# Patient Record
Sex: FEMALE | Race: WHITE | NOT HISPANIC OR LATINO | URBAN - METROPOLITAN AREA
[De-identification: names, ages, dates, MRNs, and addresses within clinical notes are randomized per-mention and may not be internally consistent; named-entity substitution may affect disease eponyms.]

---

## 2017-04-04 ENCOUNTER — OUTPATIENT (OUTPATIENT)
Dept: OUTPATIENT SERVICES | Facility: HOSPITAL | Age: 62
LOS: 1 days | Discharge: HOME | End: 2017-04-04

## 2017-06-27 DIAGNOSIS — R52 PAIN, UNSPECIFIED: ICD-10-CM

## 2017-07-12 ENCOUNTER — EMERGENCY (EMERGENCY)
Facility: HOSPITAL | Age: 62
LOS: 0 days | Discharge: HOME | End: 2017-07-12

## 2017-07-12 DIAGNOSIS — E03.9 HYPOTHYROIDISM, UNSPECIFIED: ICD-10-CM

## 2017-07-12 DIAGNOSIS — R10.84 GENERALIZED ABDOMINAL PAIN: ICD-10-CM

## 2017-07-12 DIAGNOSIS — Z88.1 ALLERGY STATUS TO OTHER ANTIBIOTIC AGENTS STATUS: ICD-10-CM

## 2017-07-12 DIAGNOSIS — Z79.899 OTHER LONG TERM (CURRENT) DRUG THERAPY: ICD-10-CM

## 2018-02-27 ENCOUNTER — OUTPATIENT (OUTPATIENT)
Dept: OUTPATIENT SERVICES | Facility: HOSPITAL | Age: 63
LOS: 1 days | Discharge: HOME | End: 2018-02-27

## 2018-02-27 DIAGNOSIS — H81.49 VERTIGO OF CENTRAL ORIGIN, UNSPECIFIED EAR: ICD-10-CM

## 2018-05-21 ENCOUNTER — EMERGENCY (EMERGENCY)
Facility: HOSPITAL | Age: 63
LOS: 0 days | Discharge: HOME | End: 2018-05-21
Attending: EMERGENCY MEDICINE | Admitting: EMERGENCY MEDICINE

## 2018-05-21 VITALS
HEART RATE: 66 BPM | OXYGEN SATURATION: 98 % | TEMPERATURE: 97 F | DIASTOLIC BLOOD PRESSURE: 76 MMHG | SYSTOLIC BLOOD PRESSURE: 105 MMHG | RESPIRATION RATE: 18 BRPM

## 2018-05-21 VITALS
SYSTOLIC BLOOD PRESSURE: 140 MMHG | HEIGHT: 62 IN | TEMPERATURE: 100 F | HEART RATE: 74 BPM | DIASTOLIC BLOOD PRESSURE: 66 MMHG | WEIGHT: 128.09 LBS | RESPIRATION RATE: 20 BRPM

## 2018-05-21 DIAGNOSIS — T37.3X5A ADVERSE EFFECT OF OTHER ANTIPROTOZOAL DRUGS, INITIAL ENCOUNTER: ICD-10-CM

## 2018-05-21 DIAGNOSIS — Z87.19 PERSONAL HISTORY OF OTHER DISEASES OF THE DIGESTIVE SYSTEM: ICD-10-CM

## 2018-05-21 DIAGNOSIS — E03.9 HYPOTHYROIDISM, UNSPECIFIED: ICD-10-CM

## 2018-05-21 DIAGNOSIS — T36.8X5A ADVERSE EFFECT OF OTHER SYSTEMIC ANTIBIOTICS, INITIAL ENCOUNTER: ICD-10-CM

## 2018-05-21 DIAGNOSIS — Z88.1 ALLERGY STATUS TO OTHER ANTIBIOTIC AGENTS STATUS: ICD-10-CM

## 2018-05-21 DIAGNOSIS — R10.30 LOWER ABDOMINAL PAIN, UNSPECIFIED: ICD-10-CM

## 2018-05-21 DIAGNOSIS — J39.2 OTHER DISEASES OF PHARYNX: ICD-10-CM

## 2018-05-21 DIAGNOSIS — Z79.899 OTHER LONG TERM (CURRENT) DRUG THERAPY: ICD-10-CM

## 2018-05-21 LAB
ALBUMIN SERPL ELPH-MCNC: 4 G/DL — SIGNIFICANT CHANGE UP (ref 3.5–5.2)
ALP SERPL-CCNC: 80 U/L — SIGNIFICANT CHANGE UP (ref 30–115)
ALT FLD-CCNC: 12 U/L — SIGNIFICANT CHANGE UP (ref 0–41)
ANION GAP SERPL CALC-SCNC: 12 MMOL/L — SIGNIFICANT CHANGE UP (ref 7–14)
AST SERPL-CCNC: 17 U/L — SIGNIFICANT CHANGE UP (ref 0–41)
BILIRUB SERPL-MCNC: 0.3 MG/DL — SIGNIFICANT CHANGE UP (ref 0.2–1.2)
BUN SERPL-MCNC: 13 MG/DL — SIGNIFICANT CHANGE UP (ref 10–20)
CALCIUM SERPL-MCNC: 9.1 MG/DL — SIGNIFICANT CHANGE UP (ref 8.5–10.1)
CHLORIDE SERPL-SCNC: 102 MMOL/L — SIGNIFICANT CHANGE UP (ref 98–110)
CO2 SERPL-SCNC: 24 MMOL/L — SIGNIFICANT CHANGE UP (ref 17–32)
CREAT SERPL-MCNC: 0.7 MG/DL — SIGNIFICANT CHANGE UP (ref 0.7–1.5)
GLUCOSE SERPL-MCNC: 92 MG/DL — SIGNIFICANT CHANGE UP (ref 70–99)
HCT VFR BLD CALC: 41.5 % — SIGNIFICANT CHANGE UP (ref 37–47)
HGB BLD-MCNC: 14.1 G/DL — SIGNIFICANT CHANGE UP (ref 12–16)
LACTATE SERPL-SCNC: 1.4 MMOL/L — SIGNIFICANT CHANGE UP (ref 0.5–2.2)
LIDOCAIN IGE QN: 34 U/L — SIGNIFICANT CHANGE UP (ref 7–60)
MCHC RBC-ENTMCNC: 30.5 PG — SIGNIFICANT CHANGE UP (ref 27–31)
MCHC RBC-ENTMCNC: 34 G/DL — SIGNIFICANT CHANGE UP (ref 32–37)
MCV RBC AUTO: 89.8 FL — SIGNIFICANT CHANGE UP (ref 81–99)
NRBC # BLD: 0 /100 WBCS — SIGNIFICANT CHANGE UP (ref 0–0)
PLATELET # BLD AUTO: 289 K/UL — SIGNIFICANT CHANGE UP (ref 130–400)
POTASSIUM SERPL-MCNC: 3.9 MMOL/L — SIGNIFICANT CHANGE UP (ref 3.5–5)
POTASSIUM SERPL-SCNC: 3.9 MMOL/L — SIGNIFICANT CHANGE UP (ref 3.5–5)
PROT SERPL-MCNC: 6.3 G/DL — SIGNIFICANT CHANGE UP (ref 6–8)
RBC # BLD: 4.62 M/UL — SIGNIFICANT CHANGE UP (ref 4.2–5.4)
RBC # FLD: 12.3 % — SIGNIFICANT CHANGE UP (ref 11.5–14.5)
SODIUM SERPL-SCNC: 138 MMOL/L — SIGNIFICANT CHANGE UP (ref 135–146)
WBC # BLD: 5.45 K/UL — SIGNIFICANT CHANGE UP (ref 4.8–10.8)
WBC # FLD AUTO: 5.45 K/UL — SIGNIFICANT CHANGE UP (ref 4.8–10.8)

## 2018-05-21 RX ORDER — FAMOTIDINE 10 MG/ML
20 INJECTION INTRAVENOUS ONCE
Qty: 0 | Refills: 0 | Status: COMPLETED | OUTPATIENT
Start: 2018-05-21 | End: 2018-05-21

## 2018-05-21 RX ORDER — SODIUM CHLORIDE 9 MG/ML
1000 INJECTION INTRAMUSCULAR; INTRAVENOUS; SUBCUTANEOUS ONCE
Qty: 0 | Refills: 0 | Status: COMPLETED | OUTPATIENT
Start: 2018-05-21 | End: 2018-05-21

## 2018-05-21 RX ADMIN — Medication 125 MILLIGRAM(S): at 02:10

## 2018-05-21 RX ADMIN — FAMOTIDINE 20 MILLIGRAM(S): 10 INJECTION INTRAVENOUS at 02:10

## 2018-05-21 RX ADMIN — SODIUM CHLORIDE 1000 MILLILITER(S): 9 INJECTION INTRAMUSCULAR; INTRAVENOUS; SUBCUTANEOUS at 02:10

## 2018-05-21 NOTE — ED PROVIDER NOTE - OBJECTIVE STATEMENT
61 y/o WF, presents with throat swelling tonight after taking Cipro and Flagyl for diverticulitis. Took Benadryl PTA. Denies chest pain or SOB. No rash. States lower abdominal pain not improving despite antibiotics and was referred by GI for outpatient CT.

## 2018-05-21 NOTE — ED PROVIDER NOTE - NS ED ROS FT
Review of Systems:  	•	CONSTITUTIONAL - no fever, no diaphoresis, no weight change  	•	SKIN - no rash  	•	HEMATOLOGIC - no bleeding, no bruising  	•	EYES - no eye pain, no blurred vision  	•	ENT - no change in hearing, no pain  	•	RESPIRATORY - no shortness of breath, no cough  	•	CARDIAC - no chest pain, no palpitations  	•	GI - + abd pain, no nausea, no vomiting, no diarrhea, no constipation, no bleeding  	•	GENITO-URINARY - no discharge, no dysuria; no hematuria, LMP-   	•	ENDO - no polydypsia, no polyurea, no heat/no cold intolerance  	•	MUSCULOSKELETAL - no joint paint, no swelling, no redness  	•	NEUROLOGIC - no weakness, no headache, no anesthesia, no paresthesias  	•	PSYCH - no anxiety, non suicidal, non homicidal, no hallucination, no depression  	•	ALLERGY - no rhinitis

## 2018-05-21 NOTE — ED PROVIDER NOTE - PHYSICAL EXAMINATION
VITAL SIGNS: I have reviewed nursing notes and confirm.  CONSTITUTIONAL: Well-developed; well-nourished; in no acute distress.  SKIN: Skin exam is warm and dry, no acute rash.  HEAD: Normocephalic; atraumatic.  EYES: PERRL, EOM intact; conjunctiva and sclera clear.  ENT: No nasal discharge; airway clear. Throat clear.  NECK: Supple; non tender.  No lymphadenopathy.  CARD: S1, S2 normal; no murmurs, gallops, or rubs. Regular rate and rhythm.  RESP: No wheezes, rales or rhonchi.  ABD: Normal bowel sounds; soft; non-distended; + lower abdominal tenderness, no rebound or guarding; no hepatosplenomegaly.  EXT: Normal ROM. No clubbing, cyanosis or edema.  NEURO: Alert, oriented. Grossly unremarkable. No focal deficits.  PSYCH: Cooperative, appropriate.

## 2018-05-21 NOTE — ED ADULT TRIAGE NOTE - CHIEF COMPLAINT QUOTE
I think I'm having an allergic reaction to Cipro and Flagyl. I've been on it for a few days and now my throat feels funny after tonight's dose.

## 2018-05-21 NOTE — ED PROVIDER NOTE - PROGRESS NOTE DETAILS
Work-up reviewed. Labs and CT abdo negative. Will D/C home to follow up with GI. Instructed to stop Cipro and Flagyl and take Benadryl and prednisone for allergic symptoms.

## 2018-08-27 PROBLEM — E03.9 HYPOTHYROIDISM, UNSPECIFIED: Chronic | Status: ACTIVE | Noted: 2018-05-21

## 2018-09-30 PROBLEM — Z00.00 ENCOUNTER FOR PREVENTIVE HEALTH EXAMINATION: Status: ACTIVE | Noted: 2018-09-30

## 2018-10-18 ENCOUNTER — OUTPATIENT (OUTPATIENT)
Dept: OUTPATIENT SERVICES | Facility: HOSPITAL | Age: 63
LOS: 1 days | Discharge: HOME | End: 2018-10-18

## 2018-10-22 DIAGNOSIS — Z87.310 PERSONAL HISTORY OF (HEALED) OSTEOPOROSIS FRACTURE: ICD-10-CM

## 2018-10-22 DIAGNOSIS — M89.9 DISORDER OF BONE, UNSPECIFIED: ICD-10-CM

## 2018-10-22 DIAGNOSIS — Z13.820 ENCOUNTER FOR SCREENING FOR OSTEOPOROSIS: ICD-10-CM

## 2018-10-22 DIAGNOSIS — F17.200 NICOTINE DEPENDENCE, UNSPECIFIED, UNCOMPLICATED: ICD-10-CM

## 2018-10-22 DIAGNOSIS — Z78.0 ASYMPTOMATIC MENOPAUSAL STATE: ICD-10-CM

## 2018-10-30 ENCOUNTER — APPOINTMENT (OUTPATIENT)
Dept: UROLOGY | Facility: CLINIC | Age: 63
End: 2018-10-30
Payer: COMMERCIAL

## 2018-10-30 VITALS
HEART RATE: 83 BPM | WEIGHT: 127 LBS | BODY MASS INDEX: 23.37 KG/M2 | HEIGHT: 62 IN | DIASTOLIC BLOOD PRESSURE: 78 MMHG | SYSTOLIC BLOOD PRESSURE: 114 MMHG

## 2018-10-30 DIAGNOSIS — Z83.3 FAMILY HISTORY OF DIABETES MELLITUS: ICD-10-CM

## 2018-10-30 DIAGNOSIS — Z83.79 FAMILY HISTORY OF OTHER DISEASES OF THE DIGESTIVE SYSTEM: ICD-10-CM

## 2018-10-30 DIAGNOSIS — Z82.49 FAMILY HISTORY OF ISCHEMIC HEART DISEASE AND OTHER DISEASES OF THE CIRCULATORY SYSTEM: ICD-10-CM

## 2018-10-30 DIAGNOSIS — F17.210 NICOTINE DEPENDENCE, CIGARETTES, UNCOMPLICATED: ICD-10-CM

## 2018-10-30 DIAGNOSIS — E03.9 HYPOTHYROIDISM, UNSPECIFIED: ICD-10-CM

## 2018-10-30 PROCEDURE — 99203 OFFICE O/P NEW LOW 30 MIN: CPT

## 2019-11-05 ENCOUNTER — APPOINTMENT (OUTPATIENT)
Dept: UROLOGY | Facility: CLINIC | Age: 64
End: 2019-11-05
Payer: COMMERCIAL

## 2019-11-05 VITALS — HEIGHT: 62 IN | WEIGHT: 127 LBS | BODY MASS INDEX: 23.37 KG/M2

## 2019-11-05 PROCEDURE — 99213 OFFICE O/P EST LOW 20 MIN: CPT

## 2019-11-19 ENCOUNTER — APPOINTMENT (OUTPATIENT)
Dept: UROLOGY | Facility: CLINIC | Age: 64
End: 2019-11-19

## 2019-12-06 ENCOUNTER — TRANSCRIPTION ENCOUNTER (OUTPATIENT)
Age: 64
End: 2019-12-06

## 2019-12-12 ENCOUNTER — OUTPATIENT (OUTPATIENT)
Dept: OUTPATIENT SERVICES | Facility: HOSPITAL | Age: 64
LOS: 1 days | Discharge: HOME | End: 2019-12-12

## 2019-12-12 ENCOUNTER — APPOINTMENT (OUTPATIENT)
Dept: OBGYN | Facility: CLINIC | Age: 64
End: 2019-12-12
Payer: COMMERCIAL

## 2019-12-12 VITALS
BODY MASS INDEX: 23.55 KG/M2 | HEIGHT: 62 IN | HEART RATE: 79 BPM | WEIGHT: 128 LBS | SYSTOLIC BLOOD PRESSURE: 118 MMHG | DIASTOLIC BLOOD PRESSURE: 80 MMHG

## 2019-12-12 DIAGNOSIS — N94.9 UNSPECIFIED CONDITION ASSOCIATED WITH FEMALE GENITAL ORGANS AND MENSTRUAL CYCLE: ICD-10-CM

## 2019-12-12 DIAGNOSIS — R35.0 FREQUENCY OF MICTURITION: ICD-10-CM

## 2019-12-12 DIAGNOSIS — N76.0 ACUTE VAGINITIS: ICD-10-CM

## 2019-12-12 DIAGNOSIS — Z86.39 PERSONAL HISTORY OF OTHER ENDOCRINE, NUTRITIONAL AND METABOLIC DISEASE: ICD-10-CM

## 2019-12-12 LAB
BILIRUB UR QL STRIP: NORMAL
GLUCOSE UR-MCNC: NORMAL
HCG UR QL: 0.2 EU/DL
HGB UR QL STRIP.AUTO: NORMAL
KETONES UR-MCNC: NORMAL
LEUKOCYTE ESTERASE UR QL STRIP: NORMAL
NITRITE UR QL STRIP: NORMAL
PH UR STRIP: 6.5
PROT UR STRIP-MCNC: NORMAL
SP GR UR STRIP: 1.02

## 2019-12-12 PROCEDURE — 81003 URINALYSIS AUTO W/O SCOPE: CPT | Mod: QW

## 2019-12-12 PROCEDURE — 99213 OFFICE O/P EST LOW 20 MIN: CPT

## 2019-12-12 NOTE — HISTORY OF PRESENT ILLNESS
[Menarche Age: ____] : age at menarche was [unfilled] [Sexually Active] : is sexually active [Male ___] : [unfilled] male [NA] : N/A [Contraception] : does not use contraception

## 2019-12-12 NOTE — DISCUSSION/SUMMARY
[FreeTextEntry1] : Patient with bacterial vaginosis.\par Start metrogel.\par \par Halley Jade M.D.\par

## 2019-12-12 NOTE — CHIEF COMPLAINT
[Follow Up] : follow up GYN visit [FreeTextEntry1] : Patient is here for evaluation vaginal burning , also frequent urination ,patient was seen at urgent care on December 6 urine culture was done patient was put on Macrobid , patient got phone call yesterday urine culture negative and was advise stopped Macrobid ,

## 2019-12-12 NOTE — PHYSICAL EXAM
[Vulvar Atrophy] : vulvar atrophy [Labia Minora] : labia minora [Labia Majora] : labia major [Discharge] : urethral discharge [Scant] : scant [Normal] : clitoris [Foul Smelling] : foul smelling [Saji] : yellow [Thick] : thick [No Tenderness] : no rectal tenderness [Uterine Adnexae] : were not tender and not enlarged

## 2019-12-13 DIAGNOSIS — R35.0 FREQUENCY OF MICTURITION: ICD-10-CM

## 2019-12-13 DIAGNOSIS — E03.9 HYPOTHYROIDISM, UNSPECIFIED: ICD-10-CM

## 2019-12-17 ENCOUNTER — APPOINTMENT (OUTPATIENT)
Dept: UROLOGY | Facility: CLINIC | Age: 64
End: 2019-12-17
Payer: COMMERCIAL

## 2019-12-17 DIAGNOSIS — R39.15 URGENCY OF URINATION: ICD-10-CM

## 2019-12-17 DIAGNOSIS — D30.00 BENIGN NEOPLASM OF UNSPECIFIED KIDNEY: ICD-10-CM

## 2019-12-17 DIAGNOSIS — R35.0 FREQUENCY OF MICTURITION: ICD-10-CM

## 2019-12-17 LAB
A VAGINAE DNA VAG QL NAA+PROBE: NORMAL
BVAB2 DNA VAG QL NAA+PROBE: NORMAL
C KRUSEI DNA VAG QL NAA+PROBE: NEGATIVE
C TRACH RRNA SPEC QL NAA+PROBE: NEGATIVE
MEGA1 DNA VAG QL NAA+PROBE: NORMAL
N GONORRHOEA RRNA SPEC QL NAA+PROBE: NEGATIVE
T VAGINALIS RRNA SPEC QL NAA+PROBE: NEGATIVE

## 2019-12-17 PROCEDURE — 99213 OFFICE O/P EST LOW 20 MIN: CPT

## 2019-12-17 NOTE — REVIEW OF SYSTEMS
[Chest Pain] : no chest pain [Fever] : no fever [Shortness Of Breath] : no shortness of breath [Abdominal Pain] : no abdominal pain [Dysuria] : no dysuria [Confused] : no confusion

## 2019-12-17 NOTE — ASSESSMENT
[FreeTextEntry1] : Stable left renal lesion requires no treatment.Regarding her new onset urgency a discussed with her possibility of overactive bladder but because this is a new symptom recommend bladder ultrasound to check for pathology first.  Patient will call afterwards to discuss results and if worsening can try OAB medicine.

## 2019-12-17 NOTE — HISTORY OF PRESENT ILLNESS
[FreeTextEntry1] : Patient recently had renal ultrasound followup for small Renal lesion.Left renal echogenic lesion is stable at 8 mm and is consistent with a small angiomyolipoma.\par \par She is new-onset urinary frequency and urgency was seen in urgent care center where she was told there was no urinary infection. She's been treated for vaginal infection by gynecology .  ua shows min leuks [Urinary Incontinence] : no urinary incontinence [Urinary Urgency] : no urinary urgency [Urinary Frequency] : no urinary frequency [Dysuria] : no dysuria [Hematuria - Gross] : no gross hematuria [Flank Pain] : no flank pain

## 2019-12-19 ENCOUNTER — RX RENEWAL (OUTPATIENT)
Age: 64
End: 2019-12-19

## 2019-12-26 ENCOUNTER — APPOINTMENT (OUTPATIENT)
Dept: OBGYN | Facility: CLINIC | Age: 64
End: 2019-12-26
Payer: COMMERCIAL

## 2019-12-26 ENCOUNTER — OUTPATIENT (OUTPATIENT)
Dept: OUTPATIENT SERVICES | Facility: HOSPITAL | Age: 64
LOS: 1 days | Discharge: HOME | End: 2019-12-26

## 2019-12-26 VITALS
HEART RATE: 82 BPM | WEIGHT: 128 LBS | BODY MASS INDEX: 23.55 KG/M2 | HEIGHT: 62 IN | DIASTOLIC BLOOD PRESSURE: 81 MMHG | SYSTOLIC BLOOD PRESSURE: 131 MMHG

## 2019-12-26 DIAGNOSIS — Z78.0 ASYMPTOMATIC MENOPAUSAL STATE: ICD-10-CM

## 2019-12-26 DIAGNOSIS — R87.619 UNSPECIFIED ABNORMAL CYTOLOGICAL FINDINGS IN SPECIMENS FROM CERVIX UTERI: ICD-10-CM

## 2019-12-26 DIAGNOSIS — Z01.419 ENCOUNTER FOR GYNECOLOGICAL EXAMINATION (GENERAL) (ROUTINE) W/OUT ABNORMAL FINDINGS: ICD-10-CM

## 2019-12-26 PROCEDURE — 99396 PREV VISIT EST AGE 40-64: CPT

## 2019-12-26 NOTE — PHYSICAL EXAM
[Awake] : awake [Alert] : alert [Acute Distress] : no acute distress [Mass] : no breast mass [Axillary LAD] : no axillary lymphadenopathy [Nipple Discharge] : no nipple discharge [Tender] : non tender [Soft] : soft [Vulvar Atrophy] : vulvar atrophy [Oriented x3] : oriented to person, place, and time [Normal] : uterus [Atrophy] : atrophy [Cystocele] : a cystocele [Rectocele] : a rectocele [No Bleeding] : there was no active vaginal bleeding [Uterine Adnexae] : were not tender and not enlarged [External Hemorrhoid] : an external hemorrhoid [No Tenderness] : no rectal tenderness

## 2019-12-26 NOTE — DISCUSSION/SUMMARY
[FreeTextEntry1] : Patient here for routine gynecologic exam. No complaints at the present time.\par \par Halley Jade M.D.\par

## 2019-12-26 NOTE — CHIEF COMPLAINT
[Annual Visit] : annual visit [FreeTextEntry1] : Patient here for annual GYN visit and re-evaluation of vaginal burning. Took Metrogel, but not Diflucan. Was not aware Rx was at pharmacy. Up to date with PE. Some burning remains after urination episodically. Menopausal at age 40.Also, under care of Dr. Hall\par Mammogram: overdue\par Last Pap smear: approx 6 months ago Hx abnormal pap.\par DEXA: 2018 approx\par Pelvic Sonogram: unsure if ever had one

## 2019-12-26 NOTE — HISTORY OF PRESENT ILLNESS
[Menopause Age: ____] : age at menopause was [unfilled] [Sexually Active] : is sexually active [NA] : N/A [Contraception] : does not use contraception

## 2019-12-27 DIAGNOSIS — R87.619 UNSPECIFIED ABNORMAL CYTOLOGICAL FINDINGS IN SPECIMENS FROM CERVIX UTERI: ICD-10-CM

## 2019-12-31 LAB — HPV HIGH+LOW RISK DNA PNL CVX: NOT DETECTED

## 2020-03-11 NOTE — HISTORY OF PRESENT ILLNESS
[None] : None 11-Mar-2020 09:09 [FreeTextEntry1] : ASPEN BREWER is a 64 year old female with a past medical history of renal lesion. An US revealed a subcentimeter renal lesion in the right kidney 7 x 8 mm. It was compared to US in 2015 which was 7 x 8 mm.  CT scans from July 2017 and May of 2018 showed subcentimeter lesion right kidney. Presents to the office today for a yearly follow up, last seen on 10/30/2018. Voiding well, no issues urinating. Denies gross hematuria, dysuria, flank pain, fever, or chills. Patient never had the US renal that was ordered last visit. \par

## 2020-12-21 PROBLEM — N76.0 ACUTE VAGINITIS: Status: RESOLVED | Noted: 2019-12-12 | Resolved: 2020-12-21

## 2020-12-21 PROBLEM — Z01.419 ENCOUNTER FOR ANNUAL ROUTINE GYNECOLOGICAL EXAMINATION: Status: RESOLVED | Noted: 2019-12-26 | Resolved: 2020-12-21

## 2021-04-20 ENCOUNTER — TRANSCRIPTION ENCOUNTER (OUTPATIENT)
Age: 66
End: 2021-04-20

## 2021-05-12 ENCOUNTER — TRANSCRIPTION ENCOUNTER (OUTPATIENT)
Age: 66
End: 2021-05-12

## 2021-06-17 ENCOUNTER — APPOINTMENT (OUTPATIENT)
Dept: OBGYN | Facility: CLINIC | Age: 66
End: 2021-06-17
Payer: COMMERCIAL

## 2021-06-17 VITALS
DIASTOLIC BLOOD PRESSURE: 83 MMHG | HEIGHT: 62 IN | TEMPERATURE: 97.5 F | SYSTOLIC BLOOD PRESSURE: 131 MMHG | BODY MASS INDEX: 23.74 KG/M2 | WEIGHT: 129 LBS | HEART RATE: 78 BPM

## 2021-06-17 DIAGNOSIS — Z80.7 FAMILY HISTORY OF OTHER MALIGNANT NEOPLASMS OF LYMPHOID, HEMATOPOIETIC AND RELATED TISSUES: ICD-10-CM

## 2021-06-17 DIAGNOSIS — M81.0 AGE-RELATED OSTEOPOROSIS W/OUT CURRENT PATHOLOGICAL FRACTURE: ICD-10-CM

## 2021-06-17 DIAGNOSIS — R68.82 DECREASED LIBIDO: ICD-10-CM

## 2021-06-17 LAB
BILIRUB UR QL STRIP: NORMAL
CLARITY UR: NORMAL
COLLECTION METHOD: NORMAL
GLUCOSE UR-MCNC: NORMAL
HCG UR QL: 0.2 EU/DL
HGB UR QL STRIP.AUTO: NORMAL
KETONES UR-MCNC: NORMAL
LEUKOCYTE ESTERASE UR QL STRIP: NORMAL
NITRITE UR QL STRIP: NORMAL
PH UR STRIP: 6
PROT UR STRIP-MCNC: NORMAL
SP GR UR STRIP: 1.02

## 2021-06-17 PROCEDURE — 81003 URINALYSIS AUTO W/O SCOPE: CPT | Mod: QW

## 2021-06-17 PROCEDURE — 76830 TRANSVAGINAL US NON-OB: CPT

## 2021-06-17 PROCEDURE — 99396 PREV VISIT EST AGE 40-64: CPT

## 2021-06-17 NOTE — PROCEDURE
[Cervical Pap Smear] : cervical Pap smear [Liquid Base] : liquid base [Tolerated Well] : the patient tolerated the procedure well [No Complications] : there were no complications [L: ___ cm] : L: [unfilled] cm [Retroverted] : retroverted [Not Visualized] : not visualized [FreeTextEntry9] : endometrial fluid [FreeTextEntry5] : moderate amount of fluid in endo canal 1.9cm x 4mm

## 2021-06-17 NOTE — DISCUSSION/SUMMARY
[FreeTextEntry1] : on prolia ( DR Cespedes) \par discussed u/s findings, pt wasn’t aware of fluid in canal, has colposcopic bxs in past, discussed and explained cervical stenosis, pt understood\par f/u in 6 months\par recommended bioidentical hormones for decreased libido

## 2021-06-17 NOTE — HISTORY OF PRESENT ILLNESS
[Currently In Menopause] : currently in menopause [Menopause Age: ____] : age at menopause was [unfilled] [Difficulty with [place]] : difficulty with intercourse [Options Discussed] : options discussed [Yes] : Patient has concerns regarding sex [Currently Active] : currently active [Men] : men [No] : No [postmenopausal] : postmenopausal [N] : Patient reports normal menses [PGHxTotal] : 2 [facility] [FreeTextEntry1] : low libido

## 2021-06-17 NOTE — PHYSICAL EXAM
[Examination Of The Breasts] : a normal appearance [No Masses] : no breast masses were palpable [Vulvar Atrophy] : vulvar atrophy [Labia Majora] : normal [Labia Minora] : normal [Atrophy] : atrophy [Cervical Stenosis] : cervical stenosis [Normal] : normal [Uterine Adnexae] : normal [Retroversion] : retroverted

## 2021-06-17 NOTE — REASON FOR VISIT
[Initial] : an initial consultation for [FreeTextEntry2] : annual check up, over due,new pt,possible infection

## 2021-06-28 LAB
CYTOLOGY CVX/VAG DOC THIN PREP: ABNORMAL
HPV HIGH+LOW RISK DNA PNL CVX: NOT DETECTED

## 2021-07-06 ENCOUNTER — NON-APPOINTMENT (OUTPATIENT)
Age: 66
End: 2021-07-06

## 2021-07-06 ENCOUNTER — TRANSCRIPTION ENCOUNTER (OUTPATIENT)
Age: 66
End: 2021-07-06

## 2021-07-07 ENCOUNTER — APPOINTMENT (OUTPATIENT)
Dept: OBGYN | Facility: CLINIC | Age: 66
End: 2021-07-07

## 2021-10-29 ENCOUNTER — OUTPATIENT (OUTPATIENT)
Dept: OUTPATIENT SERVICES | Facility: HOSPITAL | Age: 66
LOS: 1 days | Discharge: HOME | End: 2021-10-29

## 2021-11-01 DIAGNOSIS — Z87.310 PERSONAL HISTORY OF (HEALED) OSTEOPOROSIS FRACTURE: ICD-10-CM

## 2021-11-01 DIAGNOSIS — Z78.0 ASYMPTOMATIC MENOPAUSAL STATE: ICD-10-CM

## 2021-11-01 DIAGNOSIS — Z09 ENCOUNTER FOR FOLLOW-UP EXAMINATION AFTER COMPLETED TREATMENT FOR CONDITIONS OTHER THAN MALIGNANT NEOPLASM: ICD-10-CM

## 2021-11-01 DIAGNOSIS — M81.0 AGE-RELATED OSTEOPOROSIS WITHOUT CURRENT PATHOLOGICAL FRACTURE: ICD-10-CM

## 2021-11-01 DIAGNOSIS — F17.200 NICOTINE DEPENDENCE, UNSPECIFIED, UNCOMPLICATED: ICD-10-CM

## 2022-01-06 ENCOUNTER — APPOINTMENT (OUTPATIENT)
Dept: OBGYN | Facility: CLINIC | Age: 67
End: 2022-01-06

## 2022-06-21 DIAGNOSIS — Z01.419 ENCOUNTER FOR GYNECOLOGICAL EXAMINATION (GENERAL) (ROUTINE) W/OUT ABNORMAL FINDINGS: ICD-10-CM

## 2022-06-23 ENCOUNTER — NON-APPOINTMENT (OUTPATIENT)
Age: 67
End: 2022-06-23

## 2022-06-23 ENCOUNTER — APPOINTMENT (OUTPATIENT)
Dept: OBGYN | Facility: CLINIC | Age: 67
End: 2022-06-23
Payer: COMMERCIAL

## 2022-06-23 VITALS
HEART RATE: 72 BPM | BODY MASS INDEX: 23.55 KG/M2 | DIASTOLIC BLOOD PRESSURE: 78 MMHG | WEIGHT: 128 LBS | SYSTOLIC BLOOD PRESSURE: 120 MMHG | HEIGHT: 62 IN | TEMPERATURE: 98.2 F

## 2022-06-23 DIAGNOSIS — Z01.411 ENCOUNTER FOR GYNECOLOGICAL EXAMINATION (GENERAL) (ROUTINE) WITH ABNORMAL FINDINGS: ICD-10-CM

## 2022-06-23 PROCEDURE — 81003 URINALYSIS AUTO W/O SCOPE: CPT | Mod: QW

## 2022-06-23 PROCEDURE — 76830 TRANSVAGINAL US NON-OB: CPT

## 2022-06-23 PROCEDURE — 99396 PREV VISIT EST AGE 40-64: CPT

## 2022-06-23 NOTE — HISTORY OF PRESENT ILLNESS
[Menopause Age: ____] : age at menopause was [unfilled] [FreeTextEntry1] : annual visit\par no complaints [postmenopausal] : postmenopausal [Y] : Positive pregnancy history [Mammogramdate] : 6/2021 [TextBox_19] : [facility] [PapSmeardate] : 6/2021 [PGHxTotal] : 2 [[facility] : 2 [Currently In Menopause] : currently in menopause [Post-Menopause, No Sxs] : post-menopausal, currently without symptoms [No] : Patient does not have concerns regarding sex

## 2022-06-23 NOTE — PHYSICAL EXAM
[Chaperone Present] : A chaperone was present in the examining room during all aspects of the physical examination [Appropriately responsive] : appropriately responsive [Alert] : alert [No Acute Distress] : no acute distress [Oriented x3] : oriented x3 [Examination Of The Breasts] : a normal appearance [No Masses] : no breast masses were palpable [Labia Majora] : normal [Labia Minora] : normal [Cervical Stenosis] : cervical stenosis [Normal] : normal [Uterine Adnexae] : normal

## 2022-06-23 NOTE — PROCEDURE
[Cervical Pap Smear] : cervical Pap smear [Liquid Base] : liquid base [Tolerated Well] : the patient tolerated the procedure well [No Complications] : there were no complications [F/U Abnormal US] : f/u abnormal ultrasound [Transvaginal Ultrasound] : transvaginal ultrasound [Anteverted] : anteverted [FreeTextEntry9] : endometrial fliuid [FreeTextEntry5] : fluid in endometrial cavity 0.61cm   lining thin both ant and post wall ( 1-2mm) [FreeTextEntry7] : 0.40cc [FreeTextEntry8] : 1.01cc

## 2022-06-23 NOTE — DISCUSSION/SUMMARY
[FreeTextEntry1] : return in 6 months for f/u ultrasound to remove fluid\par fluid most likely due to cervical stenosis\par if fluid increases dramatically then cervical dilation with drainage will be done

## 2022-06-28 LAB — CYTOLOGY CVX/VAG DOC THIN PREP: ABNORMAL

## 2022-07-08 ENCOUNTER — NON-APPOINTMENT (OUTPATIENT)
Age: 67
End: 2022-07-08

## 2022-07-08 DIAGNOSIS — R92.2 INCONCLUSIVE MAMMOGRAM: ICD-10-CM

## 2022-09-13 NOTE — ED ADULT TRIAGE NOTE - MODE OF ARRIVAL
Walk in BMI: BMI (kg/m2): 24.1 (09-11-22 @ 07:52)  HbA1c: A1C with Estimated Average Glucose Result: 5.5 % (09-13-22 @ 09:13)    Glucose:   BP: 100/61 (09-13-22 @ 10:50) (100/61 - 123/88)  Lipid Panel: Date/Time: 09-13-22 @ 09:13  Cholesterol, Serum: 185  Direct LDL: --  HDL Cholesterol, Serum: 52  Total Cholesterol/HDL Ration Measurement: --  Triglycerides, Serum: 203   BMI: BMI (kg/m2): 24.1 (09-11-22 @ 07:52)  HbA1c: A1C with Estimated Average Glucose Result: 5.5 % (09-13-22 @ 09:13)    Glucose:   BP: 133/81 (09-26-22 @ 08:30) (111/69 - 133/81)  Lipid Panel: Date/Time: 09-13-22 @ 09:13  Cholesterol, Serum: 185  Direct LDL: --  HDL Cholesterol, Serum: 52  Total Cholesterol/HDL Ration Measurement: --  Triglycerides, Serum: 203

## 2022-09-16 ENCOUNTER — EMERGENCY (EMERGENCY)
Facility: HOSPITAL | Age: 67
LOS: 0 days | Discharge: LEFT BEFORE DISCHARGE | End: 2022-09-16
Attending: EMERGENCY MEDICINE | Admitting: EMERGENCY MEDICINE

## 2022-09-16 VITALS
SYSTOLIC BLOOD PRESSURE: 144 MMHG | RESPIRATION RATE: 20 BRPM | HEART RATE: 86 BPM | OXYGEN SATURATION: 98 % | DIASTOLIC BLOOD PRESSURE: 60 MMHG | WEIGHT: 130.07 LBS | TEMPERATURE: 98 F | HEIGHT: 62 IN

## 2022-09-16 DIAGNOSIS — R68.89 OTHER GENERAL SYMPTOMS AND SIGNS: ICD-10-CM

## 2022-09-16 DIAGNOSIS — E03.9 HYPOTHYROIDISM, UNSPECIFIED: ICD-10-CM

## 2022-09-16 DIAGNOSIS — R07.0 PAIN IN THROAT: ICD-10-CM

## 2022-09-16 DIAGNOSIS — E78.5 HYPERLIPIDEMIA, UNSPECIFIED: ICD-10-CM

## 2022-09-16 DIAGNOSIS — G44.209 TENSION-TYPE HEADACHE, UNSPECIFIED, NOT INTRACTABLE: ICD-10-CM

## 2022-09-16 DIAGNOSIS — Z53.29 PROCEDURE AND TREATMENT NOT CARRIED OUT BECAUSE OF PATIENT'S DECISION FOR OTHER REASONS: ICD-10-CM

## 2022-09-16 DIAGNOSIS — Z88.1 ALLERGY STATUS TO OTHER ANTIBIOTIC AGENTS STATUS: ICD-10-CM

## 2022-09-16 DIAGNOSIS — R20.0 ANESTHESIA OF SKIN: ICD-10-CM

## 2022-09-16 DIAGNOSIS — F17.210 NICOTINE DEPENDENCE, CIGARETTES, UNCOMPLICATED: ICD-10-CM

## 2022-09-16 DIAGNOSIS — Z79.82 LONG TERM (CURRENT) USE OF ASPIRIN: ICD-10-CM

## 2022-09-16 DIAGNOSIS — R29.810 FACIAL WEAKNESS: ICD-10-CM

## 2022-09-16 DIAGNOSIS — R20.2 PARESTHESIA OF SKIN: ICD-10-CM

## 2022-09-16 DIAGNOSIS — Z87.19 PERSONAL HISTORY OF OTHER DISEASES OF THE DIGESTIVE SYSTEM: ICD-10-CM

## 2022-09-16 LAB
ANION GAP SERPL CALC-SCNC: 9 MMOL/L — SIGNIFICANT CHANGE UP (ref 7–14)
BASOPHILS # BLD AUTO: 0.06 K/UL — SIGNIFICANT CHANGE UP (ref 0–0.2)
BASOPHILS NFR BLD AUTO: 0.9 % — SIGNIFICANT CHANGE UP (ref 0–1)
BUN SERPL-MCNC: 14 MG/DL — SIGNIFICANT CHANGE UP (ref 10–20)
CALCIUM SERPL-MCNC: 9 MG/DL — SIGNIFICANT CHANGE UP (ref 8.4–10.5)
CHLORIDE SERPL-SCNC: 103 MMOL/L — SIGNIFICANT CHANGE UP (ref 98–110)
CO2 SERPL-SCNC: 25 MMOL/L — SIGNIFICANT CHANGE UP (ref 17–32)
CREAT SERPL-MCNC: 0.6 MG/DL — LOW (ref 0.7–1.5)
EGFR: 100 ML/MIN/1.73M2 — SIGNIFICANT CHANGE UP
EOSINOPHIL # BLD AUTO: 0.07 K/UL — SIGNIFICANT CHANGE UP (ref 0–0.7)
EOSINOPHIL NFR BLD AUTO: 1 % — SIGNIFICANT CHANGE UP (ref 0–8)
GLUCOSE SERPL-MCNC: 88 MG/DL — SIGNIFICANT CHANGE UP (ref 70–99)
HCT VFR BLD CALC: 45.8 % — SIGNIFICANT CHANGE UP (ref 37–47)
HGB BLD-MCNC: 15.6 G/DL — SIGNIFICANT CHANGE UP (ref 12–16)
IMM GRANULOCYTES NFR BLD AUTO: 0.3 % — SIGNIFICANT CHANGE UP (ref 0.1–0.3)
LYMPHOCYTES # BLD AUTO: 1.68 K/UL — SIGNIFICANT CHANGE UP (ref 1.2–3.4)
LYMPHOCYTES # BLD AUTO: 23.9 % — SIGNIFICANT CHANGE UP (ref 20.5–51.1)
MCHC RBC-ENTMCNC: 30.8 PG — SIGNIFICANT CHANGE UP (ref 27–31)
MCHC RBC-ENTMCNC: 34.1 G/DL — SIGNIFICANT CHANGE UP (ref 32–37)
MCV RBC AUTO: 90.5 FL — SIGNIFICANT CHANGE UP (ref 81–99)
MONOCYTES # BLD AUTO: 0.49 K/UL — SIGNIFICANT CHANGE UP (ref 0.1–0.6)
MONOCYTES NFR BLD AUTO: 7 % — SIGNIFICANT CHANGE UP (ref 1.7–9.3)
NEUTROPHILS # BLD AUTO: 4.71 K/UL — SIGNIFICANT CHANGE UP (ref 1.4–6.5)
NEUTROPHILS NFR BLD AUTO: 66.9 % — SIGNIFICANT CHANGE UP (ref 42.2–75.2)
NRBC # BLD: 0 /100 WBCS — SIGNIFICANT CHANGE UP (ref 0–0)
PLATELET # BLD AUTO: 306 K/UL — SIGNIFICANT CHANGE UP (ref 130–400)
POTASSIUM SERPL-MCNC: 4.7 MMOL/L — SIGNIFICANT CHANGE UP (ref 3.5–5)
POTASSIUM SERPL-SCNC: 4.7 MMOL/L — SIGNIFICANT CHANGE UP (ref 3.5–5)
RBC # BLD: 5.06 M/UL — SIGNIFICANT CHANGE UP (ref 4.2–5.4)
RBC # FLD: 13.2 % — SIGNIFICANT CHANGE UP (ref 11.5–14.5)
SARS-COV-2 RNA SPEC QL NAA+PROBE: SIGNIFICANT CHANGE UP
SODIUM SERPL-SCNC: 137 MMOL/L — SIGNIFICANT CHANGE UP (ref 135–146)
TROPONIN T SERPL-MCNC: <0.01 NG/ML — SIGNIFICANT CHANGE UP
WBC # BLD: 7.03 K/UL — SIGNIFICANT CHANGE UP (ref 4.8–10.8)
WBC # FLD AUTO: 7.03 K/UL — SIGNIFICANT CHANGE UP (ref 4.8–10.8)

## 2022-09-16 PROCEDURE — 70450 CT HEAD/BRAIN W/O DYE: CPT | Mod: 26,ME

## 2022-09-16 PROCEDURE — 99285 EMERGENCY DEPT VISIT HI MDM: CPT

## 2022-09-16 PROCEDURE — 70496 CT ANGIOGRAPHY HEAD: CPT | Mod: 26,MG

## 2022-09-16 PROCEDURE — G1004: CPT

## 2022-09-16 PROCEDURE — 70498 CT ANGIOGRAPHY NECK: CPT | Mod: 26,MG

## 2022-09-16 RX ORDER — ASPIRIN/CALCIUM CARB/MAGNESIUM 324 MG
162 TABLET ORAL DAILY
Refills: 0 | Status: DISCONTINUED | OUTPATIENT
Start: 2022-09-16 | End: 2022-09-16

## 2022-09-16 RX ADMIN — Medication 162 MILLIGRAM(S): at 19:04

## 2022-09-16 NOTE — ED PROVIDER NOTE - OBJECTIVE STATEMENT
66 year old female with pmh of HLD, hypothyroidism, diverticulitis presents today for facial heaviness. Two weeks ago she had a similar episode of heaviness in her legs that lasted a few days before resolving spontaneously. Yesterday she woke up in the morning with a feeling of heaviness/pressure in the right side of her face mostly localized to under the eye. 66 year old female with pmh of HLD, hypothyroidism, diverticulitis presents today for facial heaviness. Two weeks ago she had a similar episode of heaviness in her legs that lasted a few days before resolving spontaneously. Yesterday she woke up in the morning with a feeling of heaviness/pressure in the right side of her face mostly localized to under the eye but feels it moves to different places. She has mild transient headaches. She thinks that the heavyness is associated with her HLD medication because she restarted taking it on Wednesday. 66 year old female with pmh of HLD, hypothyroidism, diverticulitis presents today for facial heaviness. Two weeks ago she had a similar episode of heaviness in her legs that lasted a few days before resolving spontaneously. Yesterday she woke up in the morning with a feeling of heaviness/pressure in the right side of her face mostly localized to under the eye but feels it moves to different places. She has mild transient headaches. She thinks that the heaviness is associated with her HLD medication. She stopped taking it during her first episode and she restarted taking it on Wednesday. She smokes two packs a day for the past 40 years. Denies, fevers, chills, cough, chest pain, nausea, vomiting, changes in vision, weakness.

## 2022-09-16 NOTE — ED PROVIDER NOTE - CARE PROVIDER_API CALL
Tim Macario)  Neurology  [address] [place] [address], [address]  [place] [address]  Phone: [phone]  Fax: [phone]  Follow Up Time: 1-3 Days

## 2022-09-16 NOTE — ED ADULT NURSE NOTE - EXPLANATION OF PATIENT'S REASON FOR LEAVING
Brief Operative Note  Ophthalmology Service      Date of Procedure: 10/8/2020     Attending Physician: Kelsey Lennon MD       Pre-Operative Diagnosis: Nuclear sclerotic cataract of right eye [H25.11]  Cataract of right eye [H26.9]     Post-Operative Diagnosis: Same as pre-operative diagnosis    Treatments/Procedures: Cataract extraction w/ IOL Right eye    Intraoperative Findings: Cataract right eye    Anesthesia: MAC with local lidocaine    Complications: None    Estimated Blood Loss: minimal    Specimens: None    -------------------------------------------------------------  Full Operative Report to follow.  -------------------------------------------------------------      
wants to do MRI outpatient

## 2022-09-16 NOTE — ED ADULT NURSE REASSESSMENT NOTE - NS ED NURSE REASSESS COMMENT FT1
dinner tray given, patient eating and talking on the phone at this time, no weakness, slurred speech observed

## 2022-09-16 NOTE — ED PROVIDER NOTE - NS ED ROS FT
REVIEW OF SYSTEMS:  CONSTITUTIONAL: No fever, weight loss, or fatigue  EYES: No visual disturbances  ENMT:  No difficulty hearing, + throat pain  NECK: No pain or stiffness  RESPIRATORY: No cough, No shortness of breath  CARDIOVASCULAR: No chest pain, palpitations, dizziness  GASTROINTESTINAL: No abdominal or epigastric pain. No nausea, vomiting  NEUROLOGICAL: + headaches, + pins and needles in bilateral extremities  SKIN: No itching, burning, rashes, or lesions   MUSCULOSKELETAL: No joint pain or swelling  HEME/LYMPH: No easy bruising, or bleeding gums  ALLERY AND IMMUNOLOGIC: No hives or eczema

## 2022-09-16 NOTE — ED PROVIDER NOTE - ATTENDING CONTRIBUTION TO CARE
d right sided facial heaviness/numbness that occurred for 2 day in the context of 2 week history of intermittent numbness of UE and LE that are transient. no weakness or dsyarthria, no headache, no vomiting. exam shows intact gross sensation bilaterally, nml cn 2-12, nml gait, nml finger to nose. plan is to obtain head ct, and labs.

## 2022-09-16 NOTE — ED PROVIDER NOTE - PROGRESS NOTE DETAILS
PS: Prelim CTA showing no vessel occlusion. Pt feels well. Neuro recommending obs for MRI. Spoke to pt, she is refusing. Says she just wants to go home. Explained that although CT was negative, MRI is a more detailed test and can catch things that CT can miss. Pt understanding and says she wants to follow up outpatient with neurology

## 2022-09-16 NOTE — CONSULT NOTE ADULT - ASSESSMENT
66 year old female with pmhx of HLD, hypothyroidism, diverticulitis presents today for facial heaviness.   Two weeks ago she had b/l LE heaviness and cramping which resolved after she stopped taking her statin.   Yesterday she woke up in the morning with a feeling of heaviness of b/l face in the distribution of V2 and V3, which is constant and mild.   She smokes two packs a day for the past 40 years. Denies, fevers, chills, cough, chest pain, nausea, vomiting, changes in vision, weakness.    On neurology evaluation, the exam is grossly intact, she complains of b/l V2 and V3 distribution heaviness. CTH reported negative, however since she has some risk factors including dyslipidemia and heavy smoking would recommend:   CTA head and neck  Obs for MRI brain w/o  R/o infectious work up and ESR  Utox  Remaining care at discretion of primary team  Thank you for the courtesy of this consult, Please contact us if any neurological changes or questions.

## 2022-09-16 NOTE — ED PROVIDER NOTE - PHYSICAL EXAMINATION
T(C): 36.8 (09-16-22 @ 15:17), Max: 36.8 (09-16-22 @ 15:17)  HR: 86 (09-16-22 @ 15:17) (86 - 86)  BP: 144/60 (09-16-22 @ 15:17) (144/60 - 144/60)  RR: 20 (09-16-22 @ 15:17) (20 - 20)  SpO2: 98% (09-16-22 @ 15:17) (98% - 98%)    PHYSICAL EXAM:  GENERAL: NAD, well-developed  HEAD:  Atraumatic, Normocephalic  EYES: EOMI, PERRLA, conjunctiva and sclera clear  ENT:No nasal obstruction or discharge. No tonsillar exudate, swelling or erythema.  NECK: Supple  CHEST/LUNG: Clear to auscultation bilaterally; No wheeze  HEART: Regular rate and rhythm; No murmurs, rubs, or gallops  ABDOMEN: Soft, Nontender, Nondistended  EXTREMITIES: No clubbing, cyanosis, or edema  PSYCH: AAOx3  NEUROLOGY: non-focal, strength 5/5 in all extremities  SKIN: No rashes or lesions

## 2022-09-16 NOTE — CONSULT NOTE ADULT - SUBJECTIVE AND OBJECTIVE BOX
NEUROLOGY CONSULT    HPI:  66 year old female with pmhx of HLD, hypothyroidism, diverticulitis presents today for facial heaviness.   Two weeks ago she had b/l LE heaviness and cramping which resolved after she stopped taking her statin.   Yesterday she woke up in the morning with a feeling of heaviness of b/l face in the distribution of V2 and V3, which is constant and mild.   She smokes two packs a day for the past 40 years. Denies, fevers, chills, cough, chest pain, nausea, vomiting, changes in vision, weakness.  Denies any illicit drugs, social drinker.     MEDICATIONS  Home Medications:  atorvastatin 40 mg oral tablet: 1 tab(s) orally once a day (16 Sep 2022 15:29)  Synthroid 125 mcg (0.125 mg) oral tablet: 1 tab(s) orally once a day (16 Sep 2022 15:29)    MEDICATIONS  (STANDING):  aspirin  chewable 162 milliGRAM(s) Oral daily    MEDICATIONS  (PRN):      FAMILY HISTORY:  No pertinent family history in first degree relatives      SOCIAL HISTORY: negative for tobacco, alcohol, or ilicit drug use.    Allergies    Ceftin (Unknown)    Intolerances    GEN: NAD, pleasant, cooperative  NEURO:   MENTAL STATUS: AAOx3  LANG/SPEECH: Fluent, intact naming, repetition & comprehension  CRANIAL NERVES:  II: Pupils equal and reactive, no RAPD, normal visual field and fundus  III, IV, VI: EOM intact, no gaze preference or deviation  V: normal  VII: no facial asymmetry  VIII: normal hearing to speech  MOTOR: 5/5 in both upper and lower extremities  REFLEXES: 2/4 throughout, bilateral flexor plantars  SENSORY: Normal to touch, temperature & pin prick in all extremities  COORD: Normal finger to nose and heel to shin, no tremor, no dysmetria  Gait: steady.   NIHSS: 0    LABS:                        15.6   7.03  )-----------( 306      ( 16 Sep 2022 15:50 )             45.8     09-16    137  |  103  |  14  ----------------------------<  88  4.7   |  25  |  0.6<L>    Ca    9.0      16 Sep 2022 15:50      Hemoglobin A1C:   Vitamin B12     CAPILLARY BLOOD GLUCOSE                  Microbiology:      RADIOLOGY, EKG AND ADDITIONAL TESTS: Reviewed.  < from: CT Head No Cont (09.16.22 @ 17:05) >  IMPRESSION:    No CT evidence for acute intracranial pathology.    --- End of Report ---    < end of copied text >

## 2022-09-16 NOTE — ED PROVIDER NOTE - CLINICAL SUMMARY MEDICAL DECISION MAKING FREE TEXT BOX
Patient evaluated for right facial heaviness and paresthesias.  CT head and CT angio were negative for any acute process.  After neurologic consultation there is recommendation to obtain MRI.  After discussion with the patient patient refused further work-up.  Lab work was normal.  Aspirin was started on the patient.  Patient will continue her atorvastatin.  Patient will follow up with neurology as an outpatient. Patient to be discharged from ED. Any available test results were discussed with patient and/or family. Verbal instructions given, including instructions to return to ED immediately for any new, worsening, or concerning symptoms. Patient endorsed understanding. Written discharge instructions additionally given, including follow-up plan.

## 2022-09-16 NOTE — CONSULT NOTE ADULT - ATTENDING COMMENTS
Plan was discussed with me overnight. Recommended to admit the patient for obs for Brain MRI. Patient refused and left AMA. Did not see the patient

## 2022-09-16 NOTE — ED PROVIDER NOTE - NSFOLLOWUPINSTRUCTIONS_ED_ALL_ED_FT
Paresthesia      Paresthesia is a burning or prickling feeling. This feeling can happen in any part of the body. It often happens in the hands, arms, legs, or feet. Usually, it is not painful. In most cases, the feeling goes away in a short time and is not a sign of a serious problem. If you have paresthesia that lasts a long time, you need to be seen by your doctor.      Follow these instructions at home:      Alcohol use    • Do not drink alcohol if:  •Your doctor tells you not to drink.       •You are pregnant, may be pregnant, or are planning to become pregnant.      •If you drink alcohol: •Limit how much you use to:  •0–1 drink a day for women.      •0–2 drinks a day for men.        •Be aware of how much alcohol is in your drink. In the [place] one drink equals one 12 oz bottle of beer (355 mL), one 5 oz glass of wine (148 mL), or one 1½ oz glass of hard liquor (44 mL).          Nutrition    •Eat a healthy diet. This includes:  •Eating foods that have a lot of fiber in them, such as fresh fruits and vegetables, whole grains, and beans.      •Limiting foods that have a lot of fat and processed sugars in them, such as fried or sweet foods.        General instructions     •Take over-the-counter and prescription medicines only as told by your doctor.      • Do not use any products that have nicotine or tobacco in them, such as cigarettes and e-cigarettes. If you need help quitting, ask your doctor.      •If you have diabetes, work with your doctor to make sure your blood sugar stays in a healthy range.    •If your feet feel numb:  •Check for redness, warmth, and swelling every day.      •Wear padded socks and comfortable shoes. These help protect your feet.        •Keep all follow-up visits as told by your doctor. This is important.        Contact a doctor if:    •You have paresthesia that gets worse or does not go away.      •Lose feeling (have numbness) after an injury.      •Your burning or prickling feeling gets worse when you walk.      •You have pain or cramps.      •You feel dizzy or pass out (faint).      •You have a rash.        Get help right away if you:    •Feel weak or have new weakness in an arm or leg.      •Have trouble walking or moving.      •Have problems speaking, understanding, or seeing.      •Feel confused.      •Cannot control when you pee (urinate) or poop (have a bowel movement).        Summary    •Paresthesia is a burning or prickling feeling. It often happens in the hands, arms, legs, or feet.      •In most cases, the feeling goes away in a short time and is not a sign of a serious problem.      •If you have paresthesia that lasts a long time, you need to be seen by your doctor.      This information is not intended to replace advice given to you by your health care provider. Make sure you discuss any questions you have with your health care provider.

## 2022-09-16 NOTE — ED ADULT NURSE NOTE - OBJECTIVE STATEMENT
66 F pmhx HLD, hypothyroidism presents to the ED with numbness to b/l facial, arm and leg numbness x two weeks. stopped taking atorvastatin x one week and restarted getting numbness again.

## 2023-01-05 ENCOUNTER — APPOINTMENT (OUTPATIENT)
Dept: OBGYN | Facility: CLINIC | Age: 68
End: 2023-01-05
Payer: MEDICARE

## 2023-01-05 VITALS
HEIGHT: 62 IN | DIASTOLIC BLOOD PRESSURE: 76 MMHG | HEART RATE: 76 BPM | SYSTOLIC BLOOD PRESSURE: 112 MMHG | BODY MASS INDEX: 23.92 KG/M2 | TEMPERATURE: 98 F | WEIGHT: 130 LBS

## 2023-01-05 PROCEDURE — 99213 OFFICE O/P EST LOW 20 MIN: CPT | Mod: 25

## 2023-01-05 PROCEDURE — 76830 TRANSVAGINAL US NON-OB: CPT

## 2023-01-05 NOTE — PHYSICAL EXAM
[Chaperone Present] : A chaperone was present in the examining room during all aspects of the physical examination [Appropriately responsive] : appropriately responsive [Alert] : alert [No Acute Distress] : no acute distress [Oriented x3] : oriented x3 [Vulvar Atrophy] : vulvar atrophy [Labia Majora] : normal [Labia Minora] : normal [Atrophy] : atrophy [Cervical Stenosis] : cervical stenosis [Normal] : normal [Uterine Adnexae] : normal

## 2023-01-05 NOTE — PROCEDURE
[F/U Abnormal US] : f/u abnormal ultrasound [Transvaginal Ultrasound] : transvaginal ultrasound [FreeTextEntry5] : moderate amt of fluid with distention of endometrial canal 9mm (H), 22mm (L), ant/post wall are thin

## 2023-01-05 NOTE — DISCUSSION/SUMMARY
[FreeTextEntry1] : no significant increase in amt of fluid since last Visit\par etiology most likely benign, cervical stenosis\par follow up 3 months, repeat ultrasound

## 2023-01-05 NOTE — HISTORY OF PRESENT ILLNESS
[postmenopausal] : postmenopausal [Y] : Positive pregnancy history [Mammogramdate] : 6/2021 [TextBox_19] : [facility] [PapSmeardate] : 6/2021 [PGHxTotal] : 2 [[facility] : 2 [FreeTextEntry1] : 2 C/S [Currently In Menopause] : currently in menopause [Post-Menopause, No Sxs] : post-menopausal, currently without symptoms [Menopause Age: ____] : age at menopause was [unfilled]

## 2023-06-05 ENCOUNTER — OUTPATIENT (OUTPATIENT)
Dept: OUTPATIENT SERVICES | Facility: HOSPITAL | Age: 68
LOS: 1 days | End: 2023-06-05
Payer: MEDICARE

## 2023-06-05 VITALS
WEIGHT: 130.95 LBS | HEART RATE: 91 BPM | OXYGEN SATURATION: 98 % | DIASTOLIC BLOOD PRESSURE: 58 MMHG | TEMPERATURE: 98 F | SYSTOLIC BLOOD PRESSURE: 117 MMHG | RESPIRATION RATE: 16 BRPM | HEIGHT: 62 IN

## 2023-06-05 DIAGNOSIS — M20.42 OTHER HAMMER TOE(S) (ACQUIRED), LEFT FOOT: ICD-10-CM

## 2023-06-05 DIAGNOSIS — Z01.818 ENCOUNTER FOR OTHER PREPROCEDURAL EXAMINATION: ICD-10-CM

## 2023-06-05 DIAGNOSIS — Z98.891 HISTORY OF UTERINE SCAR FROM PREVIOUS SURGERY: Chronic | ICD-10-CM

## 2023-06-05 DIAGNOSIS — Z98.890 OTHER SPECIFIED POSTPROCEDURAL STATES: Chronic | ICD-10-CM

## 2023-06-05 PROCEDURE — 99214 OFFICE O/P EST MOD 30 MIN: CPT | Mod: 25

## 2023-06-05 PROCEDURE — 93005 ELECTROCARDIOGRAM TRACING: CPT

## 2023-06-05 PROCEDURE — 93010 ELECTROCARDIOGRAM REPORT: CPT

## 2023-06-05 PROCEDURE — 97116 GAIT TRAINING THERAPY: CPT | Mod: GP

## 2023-06-05 RX ORDER — LEVOTHYROXINE SODIUM 125 MCG
1 TABLET ORAL
Qty: 0 | Refills: 0 | DISCHARGE

## 2023-06-05 RX ORDER — ATORVASTATIN CALCIUM 80 MG/1
1 TABLET, FILM COATED ORAL
Qty: 0 | Refills: 0 | DISCHARGE

## 2023-06-05 NOTE — H&P PST ADULT - REASON FOR ADMISSION
68 yo female presents for PAST in preparation for removal of head of proximal phalanx on left foot fifth digit on 6/16/2023 under [facility] by Dr. Cruz ([facility]).

## 2023-06-05 NOTE — H&P PST ADULT - HISTORY OF PRESENT ILLNESS
Pt states she has a bone protruding from left fifth digit on foot for many years. Complains of stabbing pain rated 8/10 constant and worse whenever wearing certain shoes. Tried conservative methods for relief but denies success. Advised to proceed with above.    Denies any chest pain, difficulty breathing, SOB, palpitations, dysuria, URI, or any other infections in the last 2 weeks. Denies any recent travel, contact, or exposure to any persons with known or suspected COVID-19. Pt also denies COVID testing within the last 2 weeks. Pt admits to receiving all doses of COVID vaccine. Denies any suicidal or homicidal ideations. Pt advised to self quarantine until day of procedure. Exercise tolerance of 2 flights of stairs without dyspnea. NIMISHA reviewed with patient. Pt verbalized understanding of all pre-operative instructions.    Anesthesia Alert  YES--Difficult Airway: Class IV  NO--History of neck surgery or radiation  NO--Limited ROM of neck  NO--History of Malignant hyperthermia  NO--No personal or family history of Pseudocholinesterase deficiency.  NO--Prior Anesthesia Complication  NO--Latex Allergy  NO--Loose teeth  NO--History of Rheumatoid Arthritis  YES--NIMISHA  NO--Bleeding Risk  NO--Other_____

## 2023-06-06 DIAGNOSIS — Z01.818 ENCOUNTER FOR OTHER PREPROCEDURAL EXAMINATION: ICD-10-CM

## 2023-06-06 DIAGNOSIS — M20.42 OTHER HAMMER TOE(S) (ACQUIRED), LEFT FOOT: ICD-10-CM

## 2023-06-16 ENCOUNTER — TRANSCRIPTION ENCOUNTER (OUTPATIENT)
Age: 68
End: 2023-06-16

## 2023-06-16 ENCOUNTER — OUTPATIENT (OUTPATIENT)
Dept: INPATIENT UNIT | Facility: HOSPITAL | Age: 68
LOS: 1 days | Discharge: HOME | End: 2023-06-16
Payer: MEDICARE

## 2023-06-16 ENCOUNTER — RESULT REVIEW (OUTPATIENT)
Age: 68
End: 2023-06-16

## 2023-06-16 VITALS
HEART RATE: 69 BPM | WEIGHT: 130.95 LBS | OXYGEN SATURATION: 100 % | DIASTOLIC BLOOD PRESSURE: 68 MMHG | HEIGHT: 62 IN | TEMPERATURE: 97 F | RESPIRATION RATE: 17 BRPM | SYSTOLIC BLOOD PRESSURE: 118 MMHG

## 2023-06-16 VITALS — HEART RATE: 62 BPM | SYSTOLIC BLOOD PRESSURE: 112 MMHG | RESPIRATION RATE: 18 BRPM | DIASTOLIC BLOOD PRESSURE: 63 MMHG

## 2023-06-16 DIAGNOSIS — Z98.890 OTHER SPECIFIED POSTPROCEDURAL STATES: Chronic | ICD-10-CM

## 2023-06-16 DIAGNOSIS — Z98.891 HISTORY OF UTERINE SCAR FROM PREVIOUS SURGERY: Chronic | ICD-10-CM

## 2023-06-16 DIAGNOSIS — M20.42 OTHER HAMMER TOE(S) (ACQUIRED), LEFT FOOT: ICD-10-CM

## 2023-06-16 PROCEDURE — 88311 DECALCIFY TISSUE: CPT

## 2023-06-16 PROCEDURE — 88311 DECALCIFY TISSUE: CPT | Mod: 26

## 2023-06-16 PROCEDURE — 73630 X-RAY EXAM OF FOOT: CPT | Mod: LT

## 2023-06-16 PROCEDURE — 88309 TISSUE EXAM BY PATHOLOGIST: CPT | Mod: 26

## 2023-06-16 PROCEDURE — 73630 X-RAY EXAM OF FOOT: CPT | Mod: 26,LT

## 2023-06-16 PROCEDURE — 88309 TISSUE EXAM BY PATHOLOGIST: CPT

## 2023-06-16 RX ORDER — ACETAMINOPHEN 500 MG
650 TABLET ORAL ONCE
Refills: 0 | Status: DISCONTINUED | OUTPATIENT
Start: 2023-06-16 | End: 2023-06-16

## 2023-06-16 RX ORDER — MEPERIDINE HYDROCHLORIDE 50 MG/ML
12.5 INJECTION INTRAMUSCULAR; INTRAVENOUS; SUBCUTANEOUS ONCE
Refills: 0 | Status: DISCONTINUED | OUTPATIENT
Start: 2023-06-16 | End: 2023-06-16

## 2023-06-16 RX ORDER — HYDROMORPHONE HYDROCHLORIDE 2 MG/ML
1 INJECTION INTRAMUSCULAR; INTRAVENOUS; SUBCUTANEOUS
Refills: 0 | Status: DISCONTINUED | OUTPATIENT
Start: 2023-06-16 | End: 2023-06-16

## 2023-06-16 RX ORDER — ONDANSETRON 8 MG/1
4 TABLET, FILM COATED ORAL ONCE
Refills: 0 | Status: DISCONTINUED | OUTPATIENT
Start: 2023-06-16 | End: 2023-06-16

## 2023-06-16 RX ORDER — SODIUM CHLORIDE 9 MG/ML
1000 INJECTION, SOLUTION INTRAVENOUS
Refills: 0 | Status: DISCONTINUED | OUTPATIENT
Start: 2023-06-16 | End: 2023-06-16

## 2023-06-16 RX ORDER — LEVOTHYROXINE SODIUM 125 MCG
1 TABLET ORAL
Refills: 0 | DISCHARGE

## 2023-06-16 RX ORDER — HYDROMORPHONE HYDROCHLORIDE 2 MG/ML
0.5 INJECTION INTRAMUSCULAR; INTRAVENOUS; SUBCUTANEOUS
Refills: 0 | Status: DISCONTINUED | OUTPATIENT
Start: 2023-06-16 | End: 2023-06-16

## 2023-06-16 RX ORDER — ATORVASTATIN CALCIUM 80 MG/1
1 TABLET, FILM COATED ORAL
Refills: 0 | DISCHARGE

## 2023-06-16 RX ADMIN — SODIUM CHLORIDE 100 MILLILITER(S): 9 INJECTION, SOLUTION INTRAVENOUS at 17:22

## 2023-06-16 NOTE — ASU DISCHARGE PLAN (ADULT/PEDIATRIC) - ASU DC SPECIAL INSTRUCTIONSFT
You had surgery on your left 4th & 5th hammertoes  Please keep your dressings dry, clean, & intact  Pain medication was sent to your pharmacy  Wear your surgical shoe when walking  Please follow up with Dr. Cruz in his clinic in 1 week

## 2023-06-16 NOTE — ASU DISCHARGE PLAN (ADULT/PEDIATRIC) - CARE PROVIDER_API CALL
Umang Cruz  Podiatric Medicine and Surgery  [address] [place] [address]  Phone: [phone]  Fax: [phone]  Established Patient  Follow Up Time: 1 week

## 2023-06-16 NOTE — BRIEF OPERATIVE NOTE - NSICDXBRIEFPROCEDURE_GEN_ALL_CORE_FT
PROCEDURES:  Arthroplasty, toe, PIP joint 16-Jun-2023 16:57:17 Left 4th & 5th PIPJ Renee Teran   (V5) oriented

## 2023-06-16 NOTE — BRIEF OPERATIVE NOTE - NSICDXBRIEFPOSTOP_GEN_ALL_CORE_FT
POST-OP DIAGNOSIS:  Hammertoe of left foot 16-Jun-2023 16:58:02 4th & 5th [facility]

## 2023-06-16 NOTE — PRE-ANESTHESIA EVALUATION ADULT - NSDENTALSD_ENT_ALL_CORE
front bridge loose- per dentist cannot come out, will need surgical removal/caps/bridge/implants/loose teeth

## 2023-06-16 NOTE — BRIEF OPERATIVE NOTE - NSICDXBRIEFPREOP_GEN_ALL_CORE_FT
PRE-OP DIAGNOSIS:  Hammertoe of left foot 16-Jun-2023 16:57:47 4th & [facility]th [facility]

## 2023-06-16 NOTE — ASU PATIENT PROFILE, ADULT - NSICDXPASTMEDICALHX_GEN_ALL_CORE_FT
Spoke with patient  Let her know fasting labs are ordered.  Patient verbalized understanding.   PAST MEDICAL HISTORY:  Cigarette smoker     High cholesterol     Hypothyroid     NIMISHA (obstructive sleep apnea)

## 2023-06-16 NOTE — PRE-ANESTHESIA EVALUATION ADULT - HEIGHT IN CM
Anesthesia Start and Stop Event Times     Date Time Event    5/21/2020 0944 Ready for Procedure     0948 Anesthesia Start     1202 Anesthesia Stop        Responsible Staff  05/21/20    Name Role Begin End    Jono Garcia M.D. Anesth 0948 1202        Preop Diagnosis (Free Text):  Pre-op Diagnosis     LUMBAR RADICULOPATHY        Preop Diagnosis (Codes):  Diagnosis Information     Diagnosis Code(s): Lumbar radiculopathy [M54.16]        Post op Diagnosis  Lumbar radiculopathy      Premium Reason  Non-Premium    Comments:                                                                       
157.48

## 2023-06-21 ENCOUNTER — APPOINTMENT (OUTPATIENT)
Dept: PULMONOLOGY | Facility: CLINIC | Age: 68
End: 2023-06-21
Payer: MEDICARE

## 2023-06-21 VITALS
DIASTOLIC BLOOD PRESSURE: 70 MMHG | SYSTOLIC BLOOD PRESSURE: 120 MMHG | BODY MASS INDEX: 23.92 KG/M2 | HEIGHT: 62 IN | OXYGEN SATURATION: 98 % | HEART RATE: 88 BPM | WEIGHT: 130 LBS

## 2023-06-21 PROBLEM — F17.210 NICOTINE DEPENDENCE, CIGARETTES, UNCOMPLICATED: Chronic | Status: ACTIVE | Noted: 2023-06-16

## 2023-06-21 PROBLEM — G47.33 OBSTRUCTIVE SLEEP APNEA (ADULT) (PEDIATRIC): Chronic | Status: ACTIVE | Noted: 2023-06-16

## 2023-06-21 PROBLEM — E78.00 PURE HYPERCHOLESTEROLEMIA, UNSPECIFIED: Chronic | Status: ACTIVE | Noted: 2023-06-05

## 2023-06-21 LAB — SURGICAL PATHOLOGY STUDY: SIGNIFICANT CHANGE UP

## 2023-06-21 PROCEDURE — 99203 OFFICE O/P NEW LOW 30 MIN: CPT

## 2023-06-21 NOTE — HISTORY OF PRESENT ILLNESS
[TextBox_4] : Ms. BREWER is a 67 year woman with a medical history significant for active tobacco abuse presenting today to the clinic for a positive sleep study performed at an outside facility. \par \par  complains of her excessive snoring\par AHI 18\par had a PSG April 2023\par smoking, 2ppd\par Patient is wary of starting CPAP, but willing to give it a try\par She is interested in pursuing mandibular advancement device

## 2023-06-23 DIAGNOSIS — M20.42 OTHER HAMMER TOE(S) (ACQUIRED), LEFT FOOT: ICD-10-CM

## 2023-06-23 DIAGNOSIS — E03.9 HYPOTHYROIDISM, UNSPECIFIED: ICD-10-CM

## 2023-06-23 DIAGNOSIS — G47.33 OBSTRUCTIVE SLEEP APNEA (ADULT) (PEDIATRIC): ICD-10-CM

## 2023-06-23 DIAGNOSIS — Z88.1 ALLERGY STATUS TO OTHER ANTIBIOTIC AGENTS STATUS: ICD-10-CM

## 2023-06-23 DIAGNOSIS — F17.210 NICOTINE DEPENDENCE, CIGARETTES, UNCOMPLICATED: ICD-10-CM

## 2023-06-23 DIAGNOSIS — Z12.39 ENCOUNTER FOR OTHER SCREENING FOR MALIGNANT NEOPLASM OF BREAST: ICD-10-CM

## 2023-06-26 ENCOUNTER — APPOINTMENT (OUTPATIENT)
Dept: OBGYN | Facility: CLINIC | Age: 68
End: 2023-06-26
Payer: MEDICARE

## 2023-06-26 DIAGNOSIS — F17.200 NICOTINE DEPENDENCE, UNSPECIFIED, UNCOMPLICATED: ICD-10-CM

## 2023-06-26 DIAGNOSIS — N85.9 NONINFLAMMATORY DISORDER OF UTERUS, UNSPECIFIED: ICD-10-CM

## 2023-06-26 DIAGNOSIS — Z12.4 ENCOUNTER FOR SCREENING FOR MALIGNANT NEOPLASM OF CERVIX: ICD-10-CM

## 2023-06-26 DIAGNOSIS — N88.2 STRICTURE AND STENOSIS OF CERVIX UTERI: ICD-10-CM

## 2023-06-26 PROCEDURE — 99213 OFFICE O/P EST LOW 20 MIN: CPT | Mod: 25

## 2023-06-26 PROCEDURE — 76830 TRANSVAGINAL US NON-OB: CPT

## 2023-06-26 RX ORDER — LEVOTHYROXINE SODIUM 200 MCG
VIAL (EA) INTRAVENOUS
Refills: 0 | Status: ACTIVE | COMMUNITY

## 2023-06-26 RX ORDER — METRONIDAZOLE 7.5 MG/G
0.75 GEL VAGINAL
Qty: 1 | Refills: 0 | Status: COMPLETED | COMMUNITY
Start: 2019-12-12 | End: 2023-06-26

## 2023-06-26 RX ORDER — FLUCONAZOLE 150 MG/1
150 TABLET ORAL DAILY
Qty: 1 | Refills: 0 | Status: COMPLETED | COMMUNITY
Start: 2019-12-19 | End: 2023-06-26

## 2023-06-26 NOTE — DISCUSSION/SUMMARY
[FreeTextEntry1] : endometrial fluid stable, slightly decreased \par etiology most likely due to cervical stenosis\par can follow annually\par

## 2023-06-26 NOTE — HISTORY OF PRESENT ILLNESS
[Menopause Age: ____] : age at menopause was [unfilled] [Patient reported mammogram was normal] : Patient reported mammogram was normal [Patient reported PAP Smear was normal] : Patient reported PAP Smear was normal [postmenopausal] : postmenopausal [Y] : Positive pregnancy history [Mammogramdate] : 6/2022 [TextBox_19] : Regional [PapSmeardate] : 6/2022 [PGHxTotal] : 2 [[facility]] : 2 [FreeTextEntry1] : 2 C/S [Currently In Menopause] : currently in menopause [Post-Menopause, No Sxs] : post-menopausal, currently without symptoms [No] : Patient does not have concerns regarding sex [Previously active] : previously active

## 2023-06-26 NOTE — PROCEDURE
[Cervical Pap Smear] : cervical Pap smear [Liquid Base] : liquid base [Tolerated Well] : the patient tolerated the procedure well [No Complications] : there were no complications [F/U Abnormal US] : f/u abnormal ultrasound [Transvaginal Ultrasound] : transvaginal ultrasound [Anteverted] : anteverted [No Fibroid(s)] : no fibroid(s) [FreeTextEntry5] : endometrial fluid measured  16.41mm x 5mm

## 2023-07-02 LAB — CYTOLOGY CVX/VAG DOC THIN PREP: ABNORMAL

## 2023-10-28 ENCOUNTER — EMERGENCY (EMERGENCY)
Facility: HOSPITAL | Age: 68
LOS: 0 days | Discharge: HOME | End: 2023-10-28
Attending: STUDENT IN AN ORGANIZED HEALTH CARE EDUCATION/TRAINING PROGRAM
Payer: MEDICARE

## 2023-10-28 VITALS
SYSTOLIC BLOOD PRESSURE: 136 MMHG | WEIGHT: 130.07 LBS | HEART RATE: 84 BPM | OXYGEN SATURATION: 99 % | TEMPERATURE: 98 F | RESPIRATION RATE: 18 BRPM | DIASTOLIC BLOOD PRESSURE: 60 MMHG

## 2023-10-28 DIAGNOSIS — Z98.890 OTHER SPECIFIED POSTPROCEDURAL STATES: Chronic | ICD-10-CM

## 2023-10-28 DIAGNOSIS — E78.5 HYPERLIPIDEMIA, UNSPECIFIED: ICD-10-CM

## 2023-10-28 DIAGNOSIS — Z87.59 PERSONAL HISTORY OF OTHER COMPLICATIONS OF PREGNANCY, CHILDBIRTH AND THE PUERPERIUM: ICD-10-CM

## 2023-10-28 DIAGNOSIS — R10.30 LOWER ABDOMINAL PAIN, UNSPECIFIED: ICD-10-CM

## 2023-10-28 DIAGNOSIS — E03.9 HYPOTHYROIDISM, UNSPECIFIED: ICD-10-CM

## 2023-10-28 DIAGNOSIS — R19.7 DIARRHEA, UNSPECIFIED: ICD-10-CM

## 2023-10-28 DIAGNOSIS — K52.9 NONINFECTIVE GASTROENTERITIS AND COLITIS, UNSPECIFIED: ICD-10-CM

## 2023-10-28 DIAGNOSIS — F17.200 NICOTINE DEPENDENCE, UNSPECIFIED, UNCOMPLICATED: ICD-10-CM

## 2023-10-28 DIAGNOSIS — Z98.891 HISTORY OF UTERINE SCAR FROM PREVIOUS SURGERY: Chronic | ICD-10-CM

## 2023-10-28 DIAGNOSIS — Z88.1 ALLERGY STATUS TO OTHER ANTIBIOTIC AGENTS STATUS: ICD-10-CM

## 2023-10-28 LAB
ALBUMIN SERPL ELPH-MCNC: 4.3 G/DL — SIGNIFICANT CHANGE UP (ref 3.5–5.2)
ALBUMIN SERPL ELPH-MCNC: 4.3 G/DL — SIGNIFICANT CHANGE UP (ref 3.5–5.2)
ALP SERPL-CCNC: 47 U/L — SIGNIFICANT CHANGE UP (ref 30–115)
ALP SERPL-CCNC: 47 U/L — SIGNIFICANT CHANGE UP (ref 30–115)
ALT FLD-CCNC: 14 U/L — SIGNIFICANT CHANGE UP (ref 0–41)
ALT FLD-CCNC: 14 U/L — SIGNIFICANT CHANGE UP (ref 0–41)
ANION GAP SERPL CALC-SCNC: 9 MMOL/L — SIGNIFICANT CHANGE UP (ref 7–14)
ANION GAP SERPL CALC-SCNC: 9 MMOL/L — SIGNIFICANT CHANGE UP (ref 7–14)
APPEARANCE UR: CLEAR — SIGNIFICANT CHANGE UP
APPEARANCE UR: CLEAR — SIGNIFICANT CHANGE UP
AST SERPL-CCNC: 18 U/L — SIGNIFICANT CHANGE UP (ref 0–41)
AST SERPL-CCNC: 18 U/L — SIGNIFICANT CHANGE UP (ref 0–41)
BACTERIA # UR AUTO: NEGATIVE /HPF — SIGNIFICANT CHANGE UP
BACTERIA # UR AUTO: NEGATIVE /HPF — SIGNIFICANT CHANGE UP
BASOPHILS # BLD AUTO: 0.05 K/UL — SIGNIFICANT CHANGE UP (ref 0–0.2)
BASOPHILS # BLD AUTO: 0.05 K/UL — SIGNIFICANT CHANGE UP (ref 0–0.2)
BASOPHILS NFR BLD AUTO: 0.8 % — SIGNIFICANT CHANGE UP (ref 0–1)
BASOPHILS NFR BLD AUTO: 0.8 % — SIGNIFICANT CHANGE UP (ref 0–1)
BILIRUB SERPL-MCNC: 0.3 MG/DL — SIGNIFICANT CHANGE UP (ref 0.2–1.2)
BILIRUB SERPL-MCNC: 0.3 MG/DL — SIGNIFICANT CHANGE UP (ref 0.2–1.2)
BILIRUB UR-MCNC: NEGATIVE — SIGNIFICANT CHANGE UP
BILIRUB UR-MCNC: NEGATIVE — SIGNIFICANT CHANGE UP
BUN SERPL-MCNC: 14 MG/DL — SIGNIFICANT CHANGE UP (ref 10–20)
BUN SERPL-MCNC: 14 MG/DL — SIGNIFICANT CHANGE UP (ref 10–20)
CALCIUM SERPL-MCNC: 8.6 MG/DL — SIGNIFICANT CHANGE UP (ref 8.4–10.5)
CALCIUM SERPL-MCNC: 8.6 MG/DL — SIGNIFICANT CHANGE UP (ref 8.4–10.5)
CAST: 0 /LPF — SIGNIFICANT CHANGE UP (ref 0–4)
CAST: 0 /LPF — SIGNIFICANT CHANGE UP (ref 0–4)
CHLORIDE SERPL-SCNC: 104 MMOL/L — SIGNIFICANT CHANGE UP (ref 98–110)
CHLORIDE SERPL-SCNC: 104 MMOL/L — SIGNIFICANT CHANGE UP (ref 98–110)
CO2 SERPL-SCNC: 25 MMOL/L — SIGNIFICANT CHANGE UP (ref 17–32)
CO2 SERPL-SCNC: 25 MMOL/L — SIGNIFICANT CHANGE UP (ref 17–32)
COLOR SPEC: YELLOW — SIGNIFICANT CHANGE UP
COLOR SPEC: YELLOW — SIGNIFICANT CHANGE UP
CREAT SERPL-MCNC: 0.6 MG/DL — LOW (ref 0.7–1.5)
CREAT SERPL-MCNC: 0.6 MG/DL — LOW (ref 0.7–1.5)
DIFF PNL FLD: NEGATIVE — SIGNIFICANT CHANGE UP
DIFF PNL FLD: NEGATIVE — SIGNIFICANT CHANGE UP
EGFR: 99 ML/MIN/1.73M2 — SIGNIFICANT CHANGE UP
EGFR: 99 ML/MIN/1.73M2 — SIGNIFICANT CHANGE UP
EOSINOPHIL # BLD AUTO: 0.08 K/UL — SIGNIFICANT CHANGE UP (ref 0–0.7)
EOSINOPHIL # BLD AUTO: 0.08 K/UL — SIGNIFICANT CHANGE UP (ref 0–0.7)
EOSINOPHIL NFR BLD AUTO: 1.2 % — SIGNIFICANT CHANGE UP (ref 0–8)
EOSINOPHIL NFR BLD AUTO: 1.2 % — SIGNIFICANT CHANGE UP (ref 0–8)
GLUCOSE SERPL-MCNC: 89 MG/DL — SIGNIFICANT CHANGE UP (ref 70–99)
GLUCOSE SERPL-MCNC: 89 MG/DL — SIGNIFICANT CHANGE UP (ref 70–99)
GLUCOSE UR QL: NEGATIVE MG/DL — SIGNIFICANT CHANGE UP
GLUCOSE UR QL: NEGATIVE MG/DL — SIGNIFICANT CHANGE UP
HCT VFR BLD CALC: 43.1 % — SIGNIFICANT CHANGE UP (ref 37–47)
HCT VFR BLD CALC: 43.1 % — SIGNIFICANT CHANGE UP (ref 37–47)
HGB BLD-MCNC: 14.1 G/DL — SIGNIFICANT CHANGE UP (ref 12–16)
HGB BLD-MCNC: 14.1 G/DL — SIGNIFICANT CHANGE UP (ref 12–16)
IMM GRANULOCYTES NFR BLD AUTO: 0.3 % — SIGNIFICANT CHANGE UP (ref 0.1–0.3)
IMM GRANULOCYTES NFR BLD AUTO: 0.3 % — SIGNIFICANT CHANGE UP (ref 0.1–0.3)
KETONES UR-MCNC: NEGATIVE MG/DL — SIGNIFICANT CHANGE UP
KETONES UR-MCNC: NEGATIVE MG/DL — SIGNIFICANT CHANGE UP
LACTATE SERPL-SCNC: 0.8 MMOL/L — SIGNIFICANT CHANGE UP (ref 0.7–2)
LACTATE SERPL-SCNC: 0.8 MMOL/L — SIGNIFICANT CHANGE UP (ref 0.7–2)
LEUKOCYTE ESTERASE UR-ACNC: ABNORMAL
LEUKOCYTE ESTERASE UR-ACNC: ABNORMAL
LIDOCAIN IGE QN: 20 U/L — SIGNIFICANT CHANGE UP (ref 7–60)
LIDOCAIN IGE QN: 20 U/L — SIGNIFICANT CHANGE UP (ref 7–60)
LYMPHOCYTES # BLD AUTO: 1.38 K/UL — SIGNIFICANT CHANGE UP (ref 1.2–3.4)
LYMPHOCYTES # BLD AUTO: 1.38 K/UL — SIGNIFICANT CHANGE UP (ref 1.2–3.4)
LYMPHOCYTES # BLD AUTO: 21.3 % — SIGNIFICANT CHANGE UP (ref 20.5–51.1)
LYMPHOCYTES # BLD AUTO: 21.3 % — SIGNIFICANT CHANGE UP (ref 20.5–51.1)
MCHC RBC-ENTMCNC: 29.4 PG — SIGNIFICANT CHANGE UP (ref 27–31)
MCHC RBC-ENTMCNC: 29.4 PG — SIGNIFICANT CHANGE UP (ref 27–31)
MCHC RBC-ENTMCNC: 32.7 G/DL — SIGNIFICANT CHANGE UP (ref 32–37)
MCHC RBC-ENTMCNC: 32.7 G/DL — SIGNIFICANT CHANGE UP (ref 32–37)
MCV RBC AUTO: 90 FL — SIGNIFICANT CHANGE UP (ref 81–99)
MCV RBC AUTO: 90 FL — SIGNIFICANT CHANGE UP (ref 81–99)
MONOCYTES # BLD AUTO: 0.54 K/UL — SIGNIFICANT CHANGE UP (ref 0.1–0.6)
MONOCYTES # BLD AUTO: 0.54 K/UL — SIGNIFICANT CHANGE UP (ref 0.1–0.6)
MONOCYTES NFR BLD AUTO: 8.3 % — SIGNIFICANT CHANGE UP (ref 1.7–9.3)
MONOCYTES NFR BLD AUTO: 8.3 % — SIGNIFICANT CHANGE UP (ref 1.7–9.3)
NEUTROPHILS # BLD AUTO: 4.4 K/UL — SIGNIFICANT CHANGE UP (ref 1.4–6.5)
NEUTROPHILS # BLD AUTO: 4.4 K/UL — SIGNIFICANT CHANGE UP (ref 1.4–6.5)
NEUTROPHILS NFR BLD AUTO: 68.1 % — SIGNIFICANT CHANGE UP (ref 42.2–75.2)
NEUTROPHILS NFR BLD AUTO: 68.1 % — SIGNIFICANT CHANGE UP (ref 42.2–75.2)
NITRITE UR-MCNC: NEGATIVE — SIGNIFICANT CHANGE UP
NITRITE UR-MCNC: NEGATIVE — SIGNIFICANT CHANGE UP
NRBC # BLD: 0 /100 WBCS — SIGNIFICANT CHANGE UP (ref 0–0)
NRBC # BLD: 0 /100 WBCS — SIGNIFICANT CHANGE UP (ref 0–0)
PH UR: 7.5 — SIGNIFICANT CHANGE UP (ref 5–8)
PH UR: 7.5 — SIGNIFICANT CHANGE UP (ref 5–8)
PLATELET # BLD AUTO: 300 K/UL — SIGNIFICANT CHANGE UP (ref 130–400)
PLATELET # BLD AUTO: 300 K/UL — SIGNIFICANT CHANGE UP (ref 130–400)
PMV BLD: 9.8 FL — SIGNIFICANT CHANGE UP (ref 7.4–10.4)
PMV BLD: 9.8 FL — SIGNIFICANT CHANGE UP (ref 7.4–10.4)
POTASSIUM SERPL-MCNC: 4.8 MMOL/L — SIGNIFICANT CHANGE UP (ref 3.5–5)
POTASSIUM SERPL-MCNC: 4.8 MMOL/L — SIGNIFICANT CHANGE UP (ref 3.5–5)
POTASSIUM SERPL-SCNC: 4.8 MMOL/L — SIGNIFICANT CHANGE UP (ref 3.5–5)
POTASSIUM SERPL-SCNC: 4.8 MMOL/L — SIGNIFICANT CHANGE UP (ref 3.5–5)
PROT SERPL-MCNC: 6.6 G/DL — SIGNIFICANT CHANGE UP (ref 6–8)
PROT SERPL-MCNC: 6.6 G/DL — SIGNIFICANT CHANGE UP (ref 6–8)
PROT UR-MCNC: NEGATIVE MG/DL — SIGNIFICANT CHANGE UP
PROT UR-MCNC: NEGATIVE MG/DL — SIGNIFICANT CHANGE UP
RBC # BLD: 4.79 M/UL — SIGNIFICANT CHANGE UP (ref 4.2–5.4)
RBC # BLD: 4.79 M/UL — SIGNIFICANT CHANGE UP (ref 4.2–5.4)
RBC # FLD: 12.8 % — SIGNIFICANT CHANGE UP (ref 11.5–14.5)
RBC # FLD: 12.8 % — SIGNIFICANT CHANGE UP (ref 11.5–14.5)
RBC CASTS # UR COMP ASSIST: 0 /HPF — SIGNIFICANT CHANGE UP (ref 0–4)
RBC CASTS # UR COMP ASSIST: 0 /HPF — SIGNIFICANT CHANGE UP (ref 0–4)
SODIUM SERPL-SCNC: 138 MMOL/L — SIGNIFICANT CHANGE UP (ref 135–146)
SODIUM SERPL-SCNC: 138 MMOL/L — SIGNIFICANT CHANGE UP (ref 135–146)
SP GR SPEC: 1 — SIGNIFICANT CHANGE UP (ref 1–1.03)
SP GR SPEC: 1 — SIGNIFICANT CHANGE UP (ref 1–1.03)
SQUAMOUS # UR AUTO: 0 /HPF — SIGNIFICANT CHANGE UP (ref 0–5)
SQUAMOUS # UR AUTO: 0 /HPF — SIGNIFICANT CHANGE UP (ref 0–5)
UROBILINOGEN FLD QL: 0.2 MG/DL — SIGNIFICANT CHANGE UP (ref 0.2–1)
UROBILINOGEN FLD QL: 0.2 MG/DL — SIGNIFICANT CHANGE UP (ref 0.2–1)
WBC # BLD: 6.47 K/UL — SIGNIFICANT CHANGE UP (ref 4.8–10.8)
WBC # BLD: 6.47 K/UL — SIGNIFICANT CHANGE UP (ref 4.8–10.8)
WBC # FLD AUTO: 6.47 K/UL — SIGNIFICANT CHANGE UP (ref 4.8–10.8)
WBC # FLD AUTO: 6.47 K/UL — SIGNIFICANT CHANGE UP (ref 4.8–10.8)
WBC UR QL: 1 /HPF — SIGNIFICANT CHANGE UP (ref 0–5)
WBC UR QL: 1 /HPF — SIGNIFICANT CHANGE UP (ref 0–5)

## 2023-10-28 PROCEDURE — 99285 EMERGENCY DEPT VISIT HI MDM: CPT | Mod: FS

## 2023-10-28 PROCEDURE — 74177 CT ABD & PELVIS W/CONTRAST: CPT | Mod: MA

## 2023-10-28 PROCEDURE — 36415 COLL VENOUS BLD VENIPUNCTURE: CPT

## 2023-10-28 PROCEDURE — 99284 EMERGENCY DEPT VISIT MOD MDM: CPT | Mod: 25

## 2023-10-28 PROCEDURE — 80053 COMPREHEN METABOLIC PANEL: CPT

## 2023-10-28 PROCEDURE — 74177 CT ABD & PELVIS W/CONTRAST: CPT | Mod: 26,MA

## 2023-10-28 PROCEDURE — 83605 ASSAY OF LACTIC ACID: CPT

## 2023-10-28 PROCEDURE — 81001 URINALYSIS AUTO W/SCOPE: CPT

## 2023-10-28 PROCEDURE — 87086 URINE CULTURE/COLONY COUNT: CPT

## 2023-10-28 PROCEDURE — 83690 ASSAY OF LIPASE: CPT

## 2023-10-28 PROCEDURE — 85025 COMPLETE CBC W/AUTO DIFF WBC: CPT

## 2023-10-28 RX ORDER — METRONIDAZOLE 500 MG
1 TABLET ORAL
Qty: 14 | Refills: 0
Start: 2023-10-28 | End: 2023-11-03

## 2023-10-28 RX ORDER — SODIUM CHLORIDE 9 MG/ML
1000 INJECTION INTRAMUSCULAR; INTRAVENOUS; SUBCUTANEOUS ONCE
Refills: 0 | Status: COMPLETED | OUTPATIENT
Start: 2023-10-28 | End: 2023-10-28

## 2023-10-28 RX ORDER — CIPROFLOXACIN LACTATE 400MG/40ML
1 VIAL (ML) INTRAVENOUS
Qty: 20 | Refills: 0
Start: 2023-10-28 | End: 2023-11-06

## 2023-10-28 RX ADMIN — SODIUM CHLORIDE 1000 MILLILITER(S): 9 INJECTION INTRAMUSCULAR; INTRAVENOUS; SUBCUTANEOUS at 12:42

## 2023-10-28 NOTE — ED PROVIDER NOTE - NSFOLLOWUPINSTRUCTIONS_ED_ALL_ED_FT
Please follow up with your GI doctor on Monday for your scheduled appointment.    Diarrhea    Diarrhea is frequent loose or watery bowel movements that has many causes. Diarrhea can make you feel weak and cause you to become dehydrated. Diarrhea typically lasts 2–3 days, but can last longer if it is a sign of something more serious. Drink clear fluids to prevent dehydration. Eat bland, easy-to-digest foods as tolerated.     SEEK IMMEDIATE MEDICAL CARE IF YOU HAVE ANY OF THE FOLLOWING SYMPTOMS: high fevers, lightheadedness/dizziness, chest pain, black or bloody stools, shortness of breath, severe abdominal or back pain, or any signs of dehydration. Please follow up with your GI doctor on Monday for your scheduled appointment.      Colitis    Colitis is inflammation of the colon. Colitis may last a short time (acute) or it may last a long time (chronic).     CAUSES  This condition may be caused by:    Viruses.  Bacteria.  Reactions to medicine.  Certain autoimmune diseases, such as Crohn disease or ulcerative colitis.    SYMPTOMS  Symptoms of this condition include:    Diarrhea.  Passing bloody or tarry stool.  Pain.  Fever.  Vomiting.  Tiredness (fatigue).  Weight loss.  Bloating.  Sudden increase in abdominal pain.  Having fewer bowel movements than usual.    DIAGNOSIS  This condition is diagnosed with a stool test or a blood test. You may also have other tests, including X-rays, a CT scan, or a colonoscopy.    TREATMENT  Treatment may include:    Resting the bowel. This involves not eating or drinking for a period of time.  Fluids that are given through an IV tube.  Medicine for pain and diarrhea.  Antibiotic medicines.  Cortisone medicines.  Surgery.    HOME CARE INSTRUCTIONS  Eating and Drinking    Follow instructions from your health care provider about eating or drinking restrictions.  Drink enough fluid to keep your urine clear or pale yellow.  Work with a dietitian to determine which foods cause your condition to flare up.  Avoid foods that cause flare-ups.  Eat a well-balanced diet.    Medicines    Take over-the-counter and prescription medicines only as told by your health care provider.  If you were prescribed an antibiotic medicine, take it as told by your health care provider. Do not stop taking the antibiotic even if you start to feel better.    General Instructions    Keep all follow-up visits as told by your health care provider. This is important.    SEEK MEDICAL CARE IF:  Your symptoms do not go away.  You develop new symptoms.    SEEK IMMEDIATE MEDICAL CARE IF:  You have a fever that does not go away with treatment.  You develop chills.  You have extreme weakness, fainting, or dehydration.  You have repeated vomiting.  You develop severe pain in your abdomen.  You pass bloody or tarry stool.    ADDITIONAL NOTES AND INSTRUCTIONS    Please follow up with your Primary MD in 24-48 hr.  Seek immediate medical care for any new/worsening signs or symptoms.

## 2023-10-28 NOTE — ED PROVIDER NOTE - NS ED ATTENDING STATEMENT MOD
This was a shared visit with the KYLER. I reviewed and verified the documentation and independently performed the documented:

## 2023-10-28 NOTE — ED PROVIDER NOTE - PHYSICAL EXAMINATION
VITAL SIGNS: I have reviewed nursing notes and confirm.  CONSTITUTIONAL: In no acute distress.  SKIN: Skin exam is warm and dry.  CARD: S1, S2; Regular rate and rhythm.  RESP: CTAB, speaking in full sentences.   ABD: Normal bowel sounds; soft; non-distended; tenderness to the lower quadrants without guarding or rebound tenderness. (-) CVA tenderness.   NEURO: Alert, oriented.

## 2023-10-28 NOTE — ED PROVIDER NOTE - PATIENT PORTAL LINK FT
You can access the FollowMyHealth Patient Portal offered by [facility] by registering at the following website: [email or web address]. By joining [facility]’s FollowMyHealth portal, you will also be able to view your health information using other applications (apps) compatible with our system.

## 2023-10-28 NOTE — ED PROVIDER NOTE - CLINICAL SUMMARY MEDICAL DECISION MAKING FREE TEXT BOX
.    Pt w/ abd pain. CT imaging c/w colitis. all labs reviewed. No acute ED events. Pt stable for dc w/ outpt f/up, abx use, and care as discussed.  Pt understands plan and signs and symptoms for ED return. DC home.     .

## 2023-10-28 NOTE — ED PROVIDER NOTE - ATTENDING APP SHARED VISIT CONTRIBUTION OF CARE
.    67 y/o F pmh thyroid d/o HLD, prior csection p/w intermittent lower cramping abd pain x 2wks/ + loose nb stools. No fever, n/v, urinary sx, back pain.      Pt is NAD, abd soft, + ttp lower abd, no r/g.    IMP: abd pain, colitis vs uti  P: labs, ivf, analgesia, CT abd reassess.

## 2023-10-28 NOTE — ED PROVIDER NOTE - OBJECTIVE STATEMENT
Patient is a 68y Female PMH hypothyroidism, HLD, PSH  sections who presents to the ED with complaints of NB diarrhea that has occurred once every morning for the past 2-3weeks, as well as intermittent, cramping lower abdominal pain that began two days ago. Patient denies aggravating and alleviating factors. Previous colonoscopy 5 years ago unremarkable, missed appt for re-screening in March. Denies fever, chills, SOB, chest pain, nausea, vomiting, recent antibiotic use or hospital visits, and urinary symptoms.

## 2023-10-28 NOTE — ED ADULT NURSE NOTE - NSICDXPASTMEDICALHX_GEN_ALL_CORE_FT
PAST MEDICAL HISTORY:  Cigarette smoker     High cholesterol     Hypothyroid     NIMISHA (obstructive sleep apnea)

## 2023-10-29 LAB
CULTURE RESULTS: SIGNIFICANT CHANGE UP
CULTURE RESULTS: SIGNIFICANT CHANGE UP
SPECIMEN SOURCE: SIGNIFICANT CHANGE UP
SPECIMEN SOURCE: SIGNIFICANT CHANGE UP

## 2023-11-16 ENCOUNTER — APPOINTMENT (OUTPATIENT)
Dept: PULMONOLOGY | Facility: CLINIC | Age: 68
End: 2023-11-16

## 2023-11-22 ENCOUNTER — APPOINTMENT (OUTPATIENT)
Dept: PULMONOLOGY | Facility: CLINIC | Age: 68
End: 2023-11-22
Payer: MEDICARE

## 2023-11-22 VITALS
DIASTOLIC BLOOD PRESSURE: 70 MMHG | OXYGEN SATURATION: 96 % | BODY MASS INDEX: 22.45 KG/M2 | WEIGHT: 122 LBS | RESPIRATION RATE: 14 BRPM | HEIGHT: 62 IN | SYSTOLIC BLOOD PRESSURE: 110 MMHG | HEART RATE: 82 BPM

## 2023-11-22 PROCEDURE — 99213 OFFICE O/P EST LOW 20 MIN: CPT

## 2024-01-07 NOTE — ED ADULT NURSE NOTE - SUICIDE SCREENING QUESTION 3
[FreeTextEntry1] : The significance of nuchal translucency testing was explained to the patient and ultrasound was performed. The sensitivity of the test can be improved by combining with second trimester quad screening. This type of sequential testing minimally increases the false positive rate, but the detection rate for Down syndrome is increased. If the sequential testing is desired, a second stage quad should be drawn and sent. As an alternative, this can be done in our office. If the patient does not desire sequential testing, then a single marker for AFP may be sent to any lab after 15 completed weeks gestation.  The patient was informed that every couple in the population has approximately a 3% risk of having a child with a birth defect undetectable by prenatal diagnosis. The risk is independent of the couple's age, medical and family histories, and exposure to medications during pregnancy.  The amniocentesis procedure was described as a test that can detect chromosome abnormalities with greater than 99% accuracy. This test can also detect open neural tube defects (open spina bifida and anencephaly) and ventral wall defects with approximately 95% accuracy. Closed neural tube defects are not detected by amniocentesis. The patient was informed of a risk of 1/200 or a half of one percent for the possible loss of the pregnancy due to the procedure. The patient understands the benefits, limitations and risks involved in connection with the amniocentesis.  Patient has elected to decline the procedure at this time.  Prenatal diagnostic testing is clinically indicated for this patient. The limitations of NIPT testing were discussed with the patient and amniocentesis was noted to remain the gold standard for prenatal diagnostic testing. The significance of NIPT testing was reviewed and offered to the patient and she has agreed to testing.NO BW as NT out of range. Sequential screening is recommended between 15-16 weeks. Anatomy scan is recommended at 19-20 weeks.
No

## 2024-01-18 ENCOUNTER — OUTPATIENT (OUTPATIENT)
Dept: OUTPATIENT SERVICES | Facility: HOSPITAL | Age: 69
LOS: 1 days | Discharge: HOME | End: 2024-01-18
Payer: MEDICARE

## 2024-01-18 ENCOUNTER — APPOINTMENT (OUTPATIENT)
Dept: SLEEP CENTER | Facility: HOSPITAL | Age: 69
End: 2024-01-18
Payer: MEDICARE

## 2024-01-18 DIAGNOSIS — Z98.891 HISTORY OF UTERINE SCAR FROM PREVIOUS SURGERY: Chronic | ICD-10-CM

## 2024-01-18 DIAGNOSIS — Z98.890 OTHER SPECIFIED POSTPROCEDURAL STATES: Chronic | ICD-10-CM

## 2024-01-18 DIAGNOSIS — G47.33 OBSTRUCTIVE SLEEP APNEA (ADULT) (PEDIATRIC): ICD-10-CM

## 2024-01-18 PROCEDURE — 95810 POLYSOM 6/> YRS 4/> PARAM: CPT | Mod: 26

## 2024-01-18 PROCEDURE — 95810 POLYSOM 6/> YRS 4/> PARAM: CPT

## 2024-01-20 DIAGNOSIS — G47.33 OBSTRUCTIVE SLEEP APNEA (ADULT) (PEDIATRIC): ICD-10-CM

## 2024-03-07 ENCOUNTER — APPOINTMENT (OUTPATIENT)
Dept: PULMONOLOGY | Facility: CLINIC | Age: 69
End: 2024-03-07
Payer: MEDICARE

## 2024-03-07 DIAGNOSIS — G47.33 OBSTRUCTIVE SLEEP APNEA (ADULT) (PEDIATRIC): ICD-10-CM

## 2024-03-07 DIAGNOSIS — R06.83 SNORING: ICD-10-CM

## 2024-03-07 PROCEDURE — 99213 OFFICE O/P EST LOW 20 MIN: CPT

## 2024-03-07 NOTE — PHYSICAL EXAM
[No Acute Distress] : no acute distress [Normal Oropharynx] : normal oropharynx [Normal Appearance] : normal appearance [No Neck Mass] : no neck mass [Normal Rate/Rhythm] : normal rate/rhythm [Normal S1, S2] : normal s1, s2 [No Murmurs] : no murmurs [No Resp Distress] : no resp distress [No Abnormalities] : no abnormalities [Clear to Auscultation Bilaterally] : clear to auscultation bilaterally [Benign] : benign [Normal Gait] : normal gait [No Clubbing] : no clubbing [No Cyanosis] : no cyanosis [No Edema] : no edema [FROM] : FROM [Normal Color/ Pigmentation] : normal color/ pigmentation [Oriented x3] : oriented x3 [No Focal Deficits] : no focal deficits [Normal Affect] : normal affect

## 2024-03-07 NOTE — HISTORY OF PRESENT ILLNESS
[TextBox_4] : Ms. BREWER is a 67 year woman with a medical history significant for active tobacco abuse presenting today to the clinic for a positive sleep study performed at an outside facility.    complains of her excessive snoring AHI 18 had a PSG April 2023 smoking, 2ppd Patient is wary of starting CPAP, but willing to give it a try She is interested in pursuing mandibular advancement device   Interval history: Patient was not using CPAP frequently enough, device was removed from her home She called frequently to the office requesting changes, we submitted further documentation the patient was willing to continue to increase use, however this did not occur and the device was eventually removed from her home

## 2024-05-02 ENCOUNTER — APPOINTMENT (OUTPATIENT)
Dept: PULMONOLOGY | Facility: CLINIC | Age: 69
End: 2024-05-02

## 2024-05-22 ENCOUNTER — APPOINTMENT (OUTPATIENT)
Dept: PULMONOLOGY | Facility: CLINIC | Age: 69
End: 2024-05-22

## 2024-06-16 NOTE — ED ADULT TRIAGE NOTE - PRO INTERPRETER NEED 2
Pt through triage c/o right shoulder and elbow pain after swigning her arms at [employer]. Per mom pt has no past medical history and is UTD on shots. Pt is well appearing in triage.   
English

## 2024-10-09 ENCOUNTER — APPOINTMENT (OUTPATIENT)
Dept: ORTHOPEDIC SURGERY | Facility: CLINIC | Age: 69
End: 2024-10-09
Payer: MEDICARE

## 2024-10-09 DIAGNOSIS — S13.9XXS SPRAIN OF JOINTS AND LIGAMENTS OF UNSPECIFIED PARTS OF NECK, SEQUELA: ICD-10-CM

## 2024-10-09 PROCEDURE — 72050 X-RAY EXAM NECK SPINE 4/5VWS: CPT

## 2024-10-09 PROCEDURE — 99204 OFFICE O/P NEW MOD 45 MIN: CPT

## 2024-10-09 RX ORDER — MELOXICAM 15 MG/1
15 TABLET ORAL
Qty: 30 | Refills: 2 | Status: ACTIVE | COMMUNITY
Start: 2024-10-09 | End: 1900-01-01

## 2024-12-18 ENCOUNTER — NON-APPOINTMENT (OUTPATIENT)
Age: 69
End: 2024-12-18

## 2024-12-19 ENCOUNTER — APPOINTMENT (OUTPATIENT)
Dept: OBGYN | Facility: CLINIC | Age: 69
End: 2024-12-19
Payer: MEDICARE

## 2024-12-19 ENCOUNTER — NON-APPOINTMENT (OUTPATIENT)
Age: 69
End: 2024-12-19

## 2024-12-19 VITALS
WEIGHT: 125 LBS | DIASTOLIC BLOOD PRESSURE: 69 MMHG | HEART RATE: 90 BPM | HEIGHT: 62 IN | SYSTOLIC BLOOD PRESSURE: 112 MMHG | BODY MASS INDEX: 23 KG/M2

## 2024-12-19 DIAGNOSIS — N94.89 OTHER SPECIFIED CONDITIONS ASSOCIATED WITH FEMALE GENITAL ORGANS AND MENSTRUAL CYCLE: ICD-10-CM

## 2024-12-19 DIAGNOSIS — N89.8 OTHER SPECIFIED NONINFLAMMATORY DISORDERS OF VAGINA: ICD-10-CM

## 2024-12-19 DIAGNOSIS — N76.2 ACUTE VULVITIS: ICD-10-CM

## 2024-12-19 DIAGNOSIS — N95.2 POSTMENOPAUSAL ATROPHIC VAGINITIS: ICD-10-CM

## 2024-12-19 LAB
BILIRUB UR QL STRIP: NEGATIVE
CLARITY UR: CLEAR
COLLECTION METHOD: NORMAL
GLUCOSE UR-MCNC: NEGATIVE
HCG UR QL: 0.2 EU/DL
HGB UR QL STRIP.AUTO: NEGATIVE
KETONES UR-MCNC: NEGATIVE
LEUKOCYTE ESTERASE UR QL STRIP: NEGATIVE
NITRITE UR QL STRIP: NEGATIVE
PH UR STRIP: 6
PROT UR STRIP-MCNC: NEGATIVE
SP GR UR STRIP: 1.02

## 2024-12-19 PROCEDURE — 81003 URINALYSIS AUTO W/O SCOPE: CPT | Mod: QW

## 2024-12-19 PROCEDURE — 99459 PELVIC EXAMINATION: CPT

## 2024-12-19 PROCEDURE — 99213 OFFICE O/P EST LOW 20 MIN: CPT

## 2024-12-19 RX ORDER — ROSUVASTATIN CALCIUM 20 MG/1
20 TABLET, FILM COATED ORAL
Refills: 0 | Status: ACTIVE | COMMUNITY

## 2024-12-19 RX ORDER — NYSTATIN AND TRIAMCINOLONE ACETONIDE 100000; 1 MG/G; MG/G
100000-0.1 CREAM TOPICAL
Qty: 1 | Refills: 0 | Status: ACTIVE | COMMUNITY
Start: 2024-12-19

## 2024-12-20 RX ORDER — NYSTATIN 100000 [USP'U]/G
100000 CREAM TOPICAL TWICE DAILY
Qty: 1 | Refills: 0 | Status: ACTIVE | COMMUNITY
Start: 2024-12-20 | End: 1900-01-01

## 2024-12-20 RX ORDER — TRIAMCINOLONE ACETONIDE 1 MG/G
0.1 CREAM TOPICAL TWICE DAILY
Qty: 1 | Refills: 0 | Status: ACTIVE | COMMUNITY
Start: 2024-12-20 | End: 1900-01-01

## 2025-01-23 ENCOUNTER — APPOINTMENT (OUTPATIENT)
Dept: OBGYN | Facility: CLINIC | Age: 70
End: 2025-01-23
Payer: MEDICARE

## 2025-01-23 VITALS
HEIGHT: 62 IN | HEART RATE: 98 BPM | DIASTOLIC BLOOD PRESSURE: 72 MMHG | SYSTOLIC BLOOD PRESSURE: 119 MMHG | WEIGHT: 125 LBS | BODY MASS INDEX: 23 KG/M2

## 2025-01-23 DIAGNOSIS — N85.9 NONINFLAMMATORY DISORDER OF UTERUS, UNSPECIFIED: ICD-10-CM

## 2025-01-23 DIAGNOSIS — N88.2 STRICTURE AND STENOSIS OF CERVIX UTERI: ICD-10-CM

## 2025-01-23 DIAGNOSIS — N76.3 SUBACUTE AND CHRONIC VULVITIS: ICD-10-CM

## 2025-01-23 PROCEDURE — 99213 OFFICE O/P EST LOW 20 MIN: CPT | Mod: 25

## 2025-01-23 PROCEDURE — 99406 BEHAV CHNG SMOKING 3-10 MIN: CPT

## 2025-01-23 PROCEDURE — 76830 TRANSVAGINAL US NON-OB: CPT

## 2025-01-23 PROCEDURE — 99459 PELVIC EXAMINATION: CPT

## 2025-01-23 PROCEDURE — G2211 COMPLEX E/M VISIT ADD ON: CPT

## 2025-03-17 ENCOUNTER — OUTPATIENT (OUTPATIENT)
Dept: OUTPATIENT SERVICES | Facility: HOSPITAL | Age: 70
LOS: 1 days | End: 2025-03-17
Payer: COMMERCIAL

## 2025-03-17 DIAGNOSIS — Z13.820 ENCOUNTER FOR SCREENING FOR OSTEOPOROSIS: ICD-10-CM

## 2025-03-17 DIAGNOSIS — Z98.890 OTHER SPECIFIED POSTPROCEDURAL STATES: Chronic | ICD-10-CM

## 2025-03-17 DIAGNOSIS — Z00.8 ENCOUNTER FOR OTHER GENERAL EXAMINATION: ICD-10-CM

## 2025-03-17 DIAGNOSIS — Z98.891 HISTORY OF UTERINE SCAR FROM PREVIOUS SURGERY: Chronic | ICD-10-CM

## 2025-03-17 PROCEDURE — 77080 DXA BONE DENSITY AXIAL: CPT | Mod: 26

## 2025-03-17 PROCEDURE — 77080 DXA BONE DENSITY AXIAL: CPT

## 2025-03-18 DIAGNOSIS — Z13.820 ENCOUNTER FOR SCREENING FOR OSTEOPOROSIS: ICD-10-CM

## 2025-04-18 ENCOUNTER — NON-APPOINTMENT (OUTPATIENT)
Age: 70
End: 2025-04-18

## 2025-04-21 ENCOUNTER — NON-APPOINTMENT (OUTPATIENT)
Age: 70
End: 2025-04-21

## 2025-07-07 ENCOUNTER — APPOINTMENT (OUTPATIENT)
Dept: OBGYN | Facility: CLINIC | Age: 70
End: 2025-07-07

## 2025-07-11 ENCOUNTER — NON-APPOINTMENT (OUTPATIENT)
Age: 70
End: 2025-07-11

## 2025-09-25 PROBLEM — Z87.891 FORMER SMOKER: Status: ACTIVE | Noted: 2025-09-25

## 2025-09-25 PROBLEM — R82.998 LEUKOCYTES IN URINE: Status: ACTIVE | Noted: 2025-09-25 | Resolved: 2025-10-25
